# Patient Record
Sex: FEMALE | Race: BLACK OR AFRICAN AMERICAN | NOT HISPANIC OR LATINO | Employment: OTHER | URBAN - METROPOLITAN AREA
[De-identification: names, ages, dates, MRNs, and addresses within clinical notes are randomized per-mention and may not be internally consistent; named-entity substitution may affect disease eponyms.]

---

## 2017-01-01 ENCOUNTER — HOSPITAL ENCOUNTER (INPATIENT)
Facility: HOSPITAL | Age: 62
LOS: 2 days | DRG: 682 | End: 2017-10-29
Attending: EMERGENCY MEDICINE | Admitting: INTERNAL MEDICINE
Payer: MEDICAID

## 2017-01-01 ENCOUNTER — SURGERY (OUTPATIENT)
Age: 62
End: 2017-01-01

## 2017-01-01 ENCOUNTER — HOSPITAL ENCOUNTER (EMERGENCY)
Facility: HOSPITAL | Age: 62
Discharge: HOME | End: 2017-06-06
Attending: EMERGENCY MEDICINE
Payer: MEDICAID

## 2017-01-01 VITALS
HEART RATE: 82 BPM | WEIGHT: 204.13 LBS | RESPIRATION RATE: 23 BRPM | TEMPERATURE: 97 F | DIASTOLIC BLOOD PRESSURE: 38 MMHG | OXYGEN SATURATION: 96 % | SYSTOLIC BLOOD PRESSURE: 117 MMHG | BODY MASS INDEX: 39.87 KG/M2

## 2017-01-01 VITALS
HEART RATE: 87 BPM | BODY MASS INDEX: 32.2 KG/M2 | WEIGHT: 164 LBS | OXYGEN SATURATION: 95 % | DIASTOLIC BLOOD PRESSURE: 80 MMHG | HEIGHT: 60 IN | RESPIRATION RATE: 20 BRPM | SYSTOLIC BLOOD PRESSURE: 121 MMHG | TEMPERATURE: 98 F

## 2017-01-01 DIAGNOSIS — I50.32 CHRONIC DIASTOLIC HEART FAILURE DUE TO VALVULAR DISEASE: Chronic | ICD-10-CM

## 2017-01-01 DIAGNOSIS — N18.9 CHRONIC KIDNEY DISEASE, UNSPECIFIED CKD STAGE: ICD-10-CM

## 2017-01-01 DIAGNOSIS — I50.9 ACUTE ON CHRONIC CONGESTIVE HEART FAILURE, UNSPECIFIED CONGESTIVE HEART FAILURE TYPE: Primary | ICD-10-CM

## 2017-01-01 DIAGNOSIS — N18.3 ACUTE RENAL FAILURE SUPERIMPOSED ON STAGE 3 CHRONIC KIDNEY DISEASE, UNSPECIFIED ACUTE RENAL FAILURE TYPE: ICD-10-CM

## 2017-01-01 DIAGNOSIS — E87.20 METABOLIC ACIDOSIS: ICD-10-CM

## 2017-01-01 DIAGNOSIS — Z79.4 TYPE 2 DIABETES MELLITUS WITH STAGE 3 CHRONIC KIDNEY DISEASE, WITH LONG-TERM CURRENT USE OF INSULIN: Chronic | ICD-10-CM

## 2017-01-01 DIAGNOSIS — N17.9 ACUTE RENAL FAILURE SUPERIMPOSED ON STAGE 3 CHRONIC KIDNEY DISEASE, UNSPECIFIED ACUTE RENAL FAILURE TYPE: ICD-10-CM

## 2017-01-01 DIAGNOSIS — I05.0 SEVERE MITRAL VALVE STENOSIS: Chronic | ICD-10-CM

## 2017-01-01 DIAGNOSIS — N18.9 ACUTE KIDNEY INJURY SUPERIMPOSED ON CHRONIC KIDNEY DISEASE: ICD-10-CM

## 2017-01-01 DIAGNOSIS — I10 ESSENTIAL HYPERTENSION: Chronic | ICD-10-CM

## 2017-01-01 DIAGNOSIS — K72.00 SUBACUTE LIVER FAILURE WITHOUT HEPATIC COMA: ICD-10-CM

## 2017-01-01 DIAGNOSIS — N18.30 TYPE 2 DIABETES MELLITUS WITH STAGE 3 CHRONIC KIDNEY DISEASE, WITH LONG-TERM CURRENT USE OF INSULIN: Chronic | ICD-10-CM

## 2017-01-01 DIAGNOSIS — I38 CHRONIC DIASTOLIC HEART FAILURE DUE TO VALVULAR DISEASE: Chronic | ICD-10-CM

## 2017-01-01 DIAGNOSIS — N17.9 ACUTE KIDNEY INJURY SUPERIMPOSED ON CHRONIC KIDNEY DISEASE: ICD-10-CM

## 2017-01-01 DIAGNOSIS — N18.30 ACUTE RENAL FAILURE SUPERIMPOSED ON STAGE 3 CHRONIC KIDNEY DISEASE: ICD-10-CM

## 2017-01-01 DIAGNOSIS — N18.30 CHRONIC KIDNEY DISEASE, STAGE III (MODERATE): ICD-10-CM

## 2017-01-01 DIAGNOSIS — G93.41 ENCEPHALOPATHY, METABOLIC: ICD-10-CM

## 2017-01-01 DIAGNOSIS — N17.9 ACUTE RENAL FAILURE SUPERIMPOSED ON STAGE 3 CHRONIC KIDNEY DISEASE: ICD-10-CM

## 2017-01-01 DIAGNOSIS — R06.02 SOB (SHORTNESS OF BREATH): ICD-10-CM

## 2017-01-01 DIAGNOSIS — D68.9 COAGULOPATHY: ICD-10-CM

## 2017-01-01 DIAGNOSIS — E11.22 TYPE 2 DIABETES MELLITUS WITH STAGE 3 CHRONIC KIDNEY DISEASE, WITH LONG-TERM CURRENT USE OF INSULIN: Chronic | ICD-10-CM

## 2017-01-01 DIAGNOSIS — I50.9 CHF (CONGESTIVE HEART FAILURE): ICD-10-CM

## 2017-01-01 DIAGNOSIS — J43.9 PULMONARY EMPHYSEMA, UNSPECIFIED EMPHYSEMA TYPE: Chronic | ICD-10-CM

## 2017-01-01 DIAGNOSIS — E87.5 HYPERKALEMIA: Primary | ICD-10-CM

## 2017-01-01 LAB
ABO + RH BLD: NORMAL
ACANTHOCYTES BLD QL SMEAR: PRESENT
ALBUMIN SERPL BCP-MCNC: 2.6 G/DL
ALBUMIN SERPL BCP-MCNC: 2.7 G/DL
ALBUMIN SERPL BCP-MCNC: 3 G/DL
ALLENS TEST: ABNORMAL
ALLENS TEST: ABNORMAL
ALP SERPL-CCNC: 129 U/L
ALP SERPL-CCNC: 138 U/L
ALP SERPL-CCNC: 157 U/L
ALT SERPL W/O P-5'-P-CCNC: 116 U/L
ALT SERPL W/O P-5'-P-CCNC: 154 U/L
ALT SERPL W/O P-5'-P-CCNC: 84 U/L
AMMONIA PLAS-SCNC: 67 UMOL/L
AMMONIA PLAS-SCNC: 76 UMOL/L
AMORPH CRY URNS QL MICRO: ABNORMAL
AMPHET+METHAMPHET UR QL: NEGATIVE
ANION GAP SERPL CALC-SCNC: 10 MMOL/L
ANION GAP SERPL CALC-SCNC: 16 MMOL/L
ANION GAP SERPL CALC-SCNC: 19 MMOL/L
ANION GAP SERPL CALC-SCNC: 21 MMOL/L
ANION GAP SERPL CALC-SCNC: 21 MMOL/L
ANION GAP SERPL CALC-SCNC: 26 MMOL/L
ANISOCYTOSIS BLD QL SMEAR: ABNORMAL
APAP SERPL-MCNC: <3 UG/ML
APTT BLDCRRT: 60.4 SEC
APTT BLDCRRT: <21 SEC
AST SERPL-CCNC: 50 U/L
AST SERPL-CCNC: 506 U/L
AST SERPL-CCNC: 653 U/L
BACTERIA #/AREA URNS HPF: ABNORMAL /HPF
BARBITURATES UR QL SCN>200 NG/ML: NEGATIVE
BASOPHILS # BLD AUTO: 0.01 K/UL
BASOPHILS # BLD AUTO: 0.02 K/UL
BASOPHILS NFR BLD: 0.1 %
BASOPHILS NFR BLD: 0.2 %
BENZODIAZ UR QL SCN>200 NG/ML: NEGATIVE
BILIRUB SERPL-MCNC: 0.7 MG/DL
BILIRUB SERPL-MCNC: 3.4 MG/DL
BILIRUB SERPL-MCNC: 4.4 MG/DL
BILIRUB UR QL STRIP: ABNORMAL
BLD GP AB SCN CELLS X3 SERPL QL: NORMAL
BNP SERPL-MCNC: 1690 PG/ML
BNP SERPL-MCNC: 4628 PG/ML
BUN SERPL-MCNC: 33 MG/DL
BUN SERPL-MCNC: 46 MG/DL
BUN SERPL-MCNC: 52 MG/DL
BUN SERPL-MCNC: 76 MG/DL
BUN SERPL-MCNC: 79 MG/DL
BUN SERPL-MCNC: 86 MG/DL
BZE UR QL SCN: NEGATIVE
CALCIUM SERPL-MCNC: 8.2 MG/DL
CALCIUM SERPL-MCNC: 8.4 MG/DL
CALCIUM SERPL-MCNC: 8.4 MG/DL
CALCIUM SERPL-MCNC: 8.5 MG/DL
CALCIUM SERPL-MCNC: 8.7 MG/DL
CALCIUM SERPL-MCNC: 8.7 MG/DL
CANNABINOIDS UR QL SCN: NEGATIVE
CHLORIDE SERPL-SCNC: 104 MMOL/L
CHLORIDE SERPL-SCNC: 96 MMOL/L
CHLORIDE SERPL-SCNC: 96 MMOL/L
CHLORIDE SERPL-SCNC: 97 MMOL/L
CHLORIDE SERPL-SCNC: 97 MMOL/L
CHLORIDE SERPL-SCNC: 99 MMOL/L
CK MB SERPL-MCNC: 1.2 NG/ML
CK MB SERPL-RTO: 2 %
CK SERPL-CCNC: 580 U/L
CK SERPL-CCNC: 59 U/L
CK SERPL-CCNC: 59 U/L
CLARITY UR: CLEAR
CO2 SERPL-SCNC: 10 MMOL/L
CO2 SERPL-SCNC: 16 MMOL/L
CO2 SERPL-SCNC: 18 MMOL/L
CO2 SERPL-SCNC: 20 MMOL/L
CO2 SERPL-SCNC: 25 MMOL/L
CO2 SERPL-SCNC: 27 MMOL/L
COLOR UR: YELLOW
CREAT SERPL-MCNC: 1.1 MG/DL
CREAT SERPL-MCNC: 2.5 MG/DL
CREAT SERPL-MCNC: 2.8 MG/DL
CREAT SERPL-MCNC: 3.6 MG/DL
CREAT SERPL-MCNC: 3.7 MG/DL
CREAT SERPL-MCNC: 3.9 MG/DL
CREAT UR-MCNC: 174.2 MG/DL
DELSYS: ABNORMAL
DELSYS: ABNORMAL
DIFFERENTIAL METHOD: ABNORMAL
EOSINOPHIL # BLD AUTO: 0 K/UL
EOSINOPHIL # BLD AUTO: 0.1 K/UL
EOSINOPHIL NFR BLD: 0 %
EOSINOPHIL NFR BLD: 0.7 %
EP: 5
ERYTHROCYTE [DISTWIDTH] IN BLOOD BY AUTOMATED COUNT: 16.7 %
ERYTHROCYTE [DISTWIDTH] IN BLOOD BY AUTOMATED COUNT: 20.8 %
ERYTHROCYTE [DISTWIDTH] IN BLOOD BY AUTOMATED COUNT: 20.9 %
ERYTHROCYTE [DISTWIDTH] IN BLOOD BY AUTOMATED COUNT: 21.1 %
EST. GFR  (AFRICAN AMERICAN): 13 ML/MIN/1.73 M^2
EST. GFR  (AFRICAN AMERICAN): 14 ML/MIN/1.73 M^2
EST. GFR  (AFRICAN AMERICAN): 15 ML/MIN/1.73 M^2
EST. GFR  (AFRICAN AMERICAN): 20 ML/MIN/1.73 M^2
EST. GFR  (AFRICAN AMERICAN): 23 ML/MIN/1.73 M^2
EST. GFR  (AFRICAN AMERICAN): >60 ML/MIN/1.73 M^2
EST. GFR  (NON AFRICAN AMERICAN): 12 ML/MIN/1.73 M^2
EST. GFR  (NON AFRICAN AMERICAN): 12 ML/MIN/1.73 M^2
EST. GFR  (NON AFRICAN AMERICAN): 13 ML/MIN/1.73 M^2
EST. GFR  (NON AFRICAN AMERICAN): 17 ML/MIN/1.73 M^2
EST. GFR  (NON AFRICAN AMERICAN): 20 ML/MIN/1.73 M^2
EST. GFR  (NON AFRICAN AMERICAN): 54 ML/MIN/1.73 M^2
ESTIMATED AVG GLUCOSE: 105 MG/DL
FIO2: 21
FIO2: 40
GIANT PLATELETS BLD QL SMEAR: PRESENT
GLUCOSE SERPL-MCNC: 116 MG/DL
GLUCOSE SERPL-MCNC: 129 MG/DL
GLUCOSE SERPL-MCNC: 131 MG/DL
GLUCOSE SERPL-MCNC: 66 MG/DL
GLUCOSE SERPL-MCNC: 74 MG/DL
GLUCOSE SERPL-MCNC: 90 MG/DL
GLUCOSE UR QL STRIP: NEGATIVE
HBA1C MFR BLD HPLC: 5.3 %
HCO3 UR-SCNC: 22 MMOL/L (ref 24–28)
HCO3 UR-SCNC: 31.6 MMOL/L (ref 24–28)
HCT VFR BLD AUTO: 22.3 %
HCT VFR BLD AUTO: 28.1 %
HCT VFR BLD AUTO: 28.9 %
HCT VFR BLD AUTO: 28.9 %
HGB BLD-MCNC: 6.6 G/DL
HGB BLD-MCNC: 8.1 G/DL
HGB BLD-MCNC: 8.3 G/DL
HGB BLD-MCNC: 8.3 G/DL
HGB UR QL STRIP: ABNORMAL
HYALINE CASTS #/AREA URNS LPF: 0 /LPF
HYPOCHROMIA BLD QL SMEAR: ABNORMAL
INR PPP: 1.2
INR PPP: >10
INR PPP: >10
IP: 14
KETONES UR QL STRIP: NEGATIVE
LACTATE SERPL-SCNC: 0.8 MMOL/L
LACTATE SERPL-SCNC: 7.6 MMOL/L
LEUKOCYTE ESTERASE UR QL STRIP: ABNORMAL
LYMPHOCYTES # BLD AUTO: 0.8 K/UL
LYMPHOCYTES # BLD AUTO: 1.7 K/UL
LYMPHOCYTES # BLD AUTO: 1.7 K/UL
LYMPHOCYTES # BLD AUTO: 2 K/UL
LYMPHOCYTES NFR BLD: 12.9 %
LYMPHOCYTES NFR BLD: 13.5 %
LYMPHOCYTES NFR BLD: 15.3 %
LYMPHOCYTES NFR BLD: 5.6 %
MAGNESIUM SERPL-MCNC: 2.5 MG/DL
MAGNESIUM SERPL-MCNC: 3.5 MG/DL
MCH RBC QN AUTO: 22 PG
MCH RBC QN AUTO: 22.3 PG
MCH RBC QN AUTO: 22.6 PG
MCH RBC QN AUTO: 24.7 PG
MCHC RBC AUTO-ENTMCNC: 28.7 G/DL
MCHC RBC AUTO-ENTMCNC: 28.7 G/DL
MCHC RBC AUTO-ENTMCNC: 28.8 %
MCHC RBC AUTO-ENTMCNC: 29.6 G/DL
MCV RBC AUTO: 75 FL
MCV RBC AUTO: 77 FL
MCV RBC AUTO: 79 FL
MCV RBC AUTO: 86 FL
METHADONE UR QL SCN>300 NG/ML: NEGATIVE
MICROSCOPIC COMMENT: ABNORMAL
MODE: ABNORMAL
MODE: ABNORMAL
MONOCYTES # BLD AUTO: 1.3 K/UL
MONOCYTES # BLD AUTO: 2.3 K/UL
MONOCYTES NFR BLD: 10.2 %
MONOCYTES NFR BLD: 18.5 %
MONOCYTES NFR BLD: 8.6 %
MONOCYTES NFR BLD: 9.9 %
NEUTROPHILS # BLD AUTO: 10.1 K/UL
NEUTROPHILS # BLD AUTO: 12.5 K/UL
NEUTROPHILS # BLD AUTO: 8.5 K/UL
NEUTROPHILS # BLD AUTO: 9.5 K/UL
NEUTROPHILS NFR BLD: 68.5 %
NEUTROPHILS NFR BLD: 73.9 %
NEUTROPHILS NFR BLD: 77 %
NEUTROPHILS NFR BLD: 86.2 %
NITRITE UR QL STRIP: NEGATIVE
OPIATES UR QL SCN: NORMAL
OVALOCYTES BLD QL SMEAR: ABNORMAL
PCO2 BLDA: 45 MMHG (ref 35–45)
PCO2 BLDA: 47.4 MMHG (ref 35–45)
PCP UR QL SCN>25 NG/ML: NEGATIVE
PH SMN: 7.3 [PH] (ref 7.35–7.45)
PH SMN: 7.43 [PH] (ref 7.35–7.45)
PH UR STRIP: 5 [PH] (ref 5–8)
PHOSPHATE SERPL-MCNC: 4.4 MG/DL
PLATELET # BLD AUTO: 223 K/UL
PLATELET # BLD AUTO: 249 K/UL
PLATELET # BLD AUTO: 273 K/UL
PLATELET # BLD AUTO: 360 K/UL
PLATELET BLD QL SMEAR: ABNORMAL
PMV BLD AUTO: 10.1 FL
PMV BLD AUTO: 10.1 FL
PMV BLD AUTO: 10.3 FL
PMV BLD AUTO: 9.6 FL
PO2 BLDA: 175 MMHG (ref 80–100)
PO2 BLDA: 52 MMHG (ref 80–100)
POC BE: -4 MMOL/L
POC BE: 7 MMOL/L
POC SATURATED O2: 100 % (ref 95–100)
POC SATURATED O2: 82 % (ref 95–100)
POCT GLUCOSE: 131 MG/DL (ref 70–110)
POCT GLUCOSE: 135 MG/DL (ref 70–110)
POCT GLUCOSE: 140 MG/DL (ref 70–110)
POCT GLUCOSE: 166 MG/DL (ref 70–110)
POCT GLUCOSE: 174 MG/DL (ref 70–110)
POIKILOCYTOSIS BLD QL SMEAR: ABNORMAL
POLYCHROMASIA BLD QL SMEAR: ABNORMAL
POTASSIUM SERPL-SCNC: 4 MMOL/L
POTASSIUM SERPL-SCNC: 4.1 MMOL/L
POTASSIUM SERPL-SCNC: 4.4 MMOL/L
POTASSIUM SERPL-SCNC: 6.7 MMOL/L
POTASSIUM SERPL-SCNC: 6.7 MMOL/L
POTASSIUM SERPL-SCNC: 7.2 MMOL/L
PROT SERPL-MCNC: 6.2 G/DL
PROT SERPL-MCNC: 7.4 G/DL
PROT SERPL-MCNC: 8.3 G/DL
PROT UR QL STRIP: ABNORMAL
PROTHROMBIN TIME: 12.8 SEC
PROTHROMBIN TIME: >100 SEC
PROTHROMBIN TIME: >100 SEC
RBC # BLD AUTO: 2.96 M/UL
RBC # BLD AUTO: 3.28 M/UL
RBC # BLD AUTO: 3.68 M/UL
RBC # BLD AUTO: 3.78 M/UL
RBC #/AREA URNS HPF: 0 /HPF (ref 0–4)
SAMPLE: ABNORMAL
SAMPLE: ABNORMAL
SCHISTOCYTES BLD QL SMEAR: PRESENT
SITE: ABNORMAL
SITE: ABNORMAL
SODIUM SERPL-SCNC: 132 MMOL/L
SODIUM SERPL-SCNC: 135 MMOL/L
SODIUM SERPL-SCNC: 135 MMOL/L
SODIUM SERPL-SCNC: 137 MMOL/L
SODIUM SERPL-SCNC: 138 MMOL/L
SODIUM SERPL-SCNC: 141 MMOL/L
SP GR UR STRIP: 1.02 (ref 1–1.03)
SQUAMOUS #/AREA URNS HPF: 3 /HPF
TARGETS BLD QL SMEAR: ABNORMAL
TOXICOLOGY INFORMATION: NORMAL
TROPONIN I SERPL DL<=0.01 NG/ML-MCNC: 0.08 NG/ML
TROPONIN I SERPL DL<=0.01 NG/ML-MCNC: 0.08 NG/ML
TSH SERPL DL<=0.005 MIU/L-ACNC: 0.75 UIU/ML
URN SPEC COLLECT METH UR: ABNORMAL
UROBILINOGEN UR STRIP-ACNC: ABNORMAL EU/DL
WBC # BLD AUTO: 12.57 K/UL
WBC # BLD AUTO: 12.88 K/UL
WBC # BLD AUTO: 13.12 K/UL
WBC # BLD AUTO: 14.58 K/UL
WBC #/AREA URNS HPF: 4 /HPF (ref 0–5)
YEAST URNS QL MICRO: ABNORMAL

## 2017-01-01 PROCEDURE — 85025 COMPLETE CBC W/AUTO DIFF WBC: CPT

## 2017-01-01 PROCEDURE — P9047 ALBUMIN (HUMAN), 25%, 50ML: HCPCS | Performed by: INTERNAL MEDICINE

## 2017-01-01 PROCEDURE — 85610 PROTHROMBIN TIME: CPT

## 2017-01-01 PROCEDURE — 80053 COMPREHEN METABOLIC PANEL: CPT

## 2017-01-01 PROCEDURE — 84100 ASSAY OF PHOSPHORUS: CPT

## 2017-01-01 PROCEDURE — 27000221 HC OXYGEN, UP TO 24 HOURS

## 2017-01-01 PROCEDURE — 36000 PLACE NEEDLE IN VEIN: CPT

## 2017-01-01 PROCEDURE — 25000242 PHARM REV CODE 250 ALT 637 W/ HCPCS: Performed by: INTERNAL MEDICINE

## 2017-01-01 PROCEDURE — 80307 DRUG TEST PRSMV CHEM ANLYZR: CPT

## 2017-01-01 PROCEDURE — 36600 WITHDRAWAL OF ARTERIAL BLOOD: CPT

## 2017-01-01 PROCEDURE — 82803 BLOOD GASES ANY COMBINATION: CPT

## 2017-01-01 PROCEDURE — 11000001 HC ACUTE MED/SURG PRIVATE ROOM

## 2017-01-01 PROCEDURE — 63600175 PHARM REV CODE 636 W HCPCS: Performed by: EMERGENCY MEDICINE

## 2017-01-01 PROCEDURE — 63600175 PHARM REV CODE 636 W HCPCS: Performed by: INTERNAL MEDICINE

## 2017-01-01 PROCEDURE — 99291 CRITICAL CARE FIRST HOUR: CPT | Mod: ,,, | Performed by: NURSE PRACTITIONER

## 2017-01-01 PROCEDURE — 80048 BASIC METABOLIC PNL TOTAL CA: CPT

## 2017-01-01 PROCEDURE — 85730 THROMBOPLASTIN TIME PARTIAL: CPT

## 2017-01-01 PROCEDURE — 25000003 PHARM REV CODE 250: Performed by: EMERGENCY MEDICINE

## 2017-01-01 PROCEDURE — 25000003 PHARM REV CODE 250: Performed by: INTERNAL MEDICINE

## 2017-01-01 PROCEDURE — 20000000 HC ICU ROOM

## 2017-01-01 PROCEDURE — 86900 BLOOD TYPING SEROLOGIC ABO: CPT

## 2017-01-01 PROCEDURE — 99233 SBSQ HOSP IP/OBS HIGH 50: CPT | Mod: ,,, | Performed by: INTERNAL MEDICINE

## 2017-01-01 PROCEDURE — 83036 HEMOGLOBIN GLYCOSYLATED A1C: CPT

## 2017-01-01 PROCEDURE — 83605 ASSAY OF LACTIC ACID: CPT

## 2017-01-01 PROCEDURE — 82550 ASSAY OF CK (CPK): CPT

## 2017-01-01 PROCEDURE — 99291 CRITICAL CARE FIRST HOUR: CPT | Mod: ,,, | Performed by: INTERNAL MEDICINE

## 2017-01-01 PROCEDURE — 86901 BLOOD TYPING SEROLOGIC RH(D): CPT

## 2017-01-01 PROCEDURE — 25000242 PHARM REV CODE 250 ALT 637 W/ HCPCS: Performed by: NURSE PRACTITIONER

## 2017-01-01 PROCEDURE — 02H633Z INSERTION OF INFUSION DEVICE INTO RIGHT ATRIUM, PERCUTANEOUS APPROACH: ICD-10-PCS | Performed by: SURGERY

## 2017-01-01 PROCEDURE — 94640 AIRWAY INHALATION TREATMENT: CPT

## 2017-01-01 PROCEDURE — 93010 ELECTROCARDIOGRAM REPORT: CPT | Mod: ,,, | Performed by: INTERNAL MEDICINE

## 2017-01-01 PROCEDURE — 93005 ELECTROCARDIOGRAM TRACING: CPT

## 2017-01-01 PROCEDURE — 87340 HEPATITIS B SURFACE AG IA: CPT

## 2017-01-01 PROCEDURE — 25000003 PHARM REV CODE 250: Performed by: NURSE PRACTITIONER

## 2017-01-01 PROCEDURE — C1769 GUIDE WIRE: HCPCS

## 2017-01-01 PROCEDURE — 84484 ASSAY OF TROPONIN QUANT: CPT

## 2017-01-01 PROCEDURE — 96374 THER/PROPH/DIAG INJ IV PUSH: CPT

## 2017-01-01 PROCEDURE — 81000 URINALYSIS NONAUTO W/SCOPE: CPT

## 2017-01-01 PROCEDURE — 99900035 HC TECH TIME PER 15 MIN (STAT)

## 2017-01-01 PROCEDURE — 83880 ASSAY OF NATRIURETIC PEPTIDE: CPT

## 2017-01-01 PROCEDURE — 27000190 HC CPAP FULL FACE MASK W/VALVE

## 2017-01-01 PROCEDURE — 96375 TX/PRO/DX INJ NEW DRUG ADDON: CPT

## 2017-01-01 PROCEDURE — 84443 ASSAY THYROID STIM HORMONE: CPT

## 2017-01-01 PROCEDURE — 82140 ASSAY OF AMMONIA: CPT

## 2017-01-01 PROCEDURE — 36415 COLL VENOUS BLD VENIPUNCTURE: CPT

## 2017-01-01 PROCEDURE — 80329 ANALGESICS NON-OPIOID 1 OR 2: CPT

## 2017-01-01 PROCEDURE — 99285 EMERGENCY DEPT VISIT HI MDM: CPT | Mod: 25

## 2017-01-01 PROCEDURE — 90935 HEMODIALYSIS ONE EVALUATION: CPT

## 2017-01-01 PROCEDURE — 83735 ASSAY OF MAGNESIUM: CPT

## 2017-01-01 PROCEDURE — 63600175 PHARM REV CODE 636 W HCPCS: Performed by: NURSE PRACTITIONER

## 2017-01-01 PROCEDURE — 63600175 PHARM REV CODE 636 W HCPCS

## 2017-01-01 PROCEDURE — 86706 HEP B SURFACE ANTIBODY: CPT

## 2017-01-01 PROCEDURE — 96365 THER/PROPH/DIAG IV INF INIT: CPT

## 2017-01-01 PROCEDURE — 80048 BASIC METABOLIC PNL TOTAL CA: CPT | Mod: 91

## 2017-01-01 PROCEDURE — 94660 CPAP INITIATION&MGMT: CPT

## 2017-01-01 PROCEDURE — 82553 CREATINE MB FRACTION: CPT

## 2017-01-01 RX ORDER — IBUPROFEN 200 MG
16 TABLET ORAL
Status: DISCONTINUED | OUTPATIENT
Start: 2017-01-01 | End: 2017-01-01

## 2017-01-01 RX ORDER — INSULIN ASPART 100 [IU]/ML
0-5 INJECTION, SOLUTION INTRAVENOUS; SUBCUTANEOUS EVERY 6 HOURS
Status: DISCONTINUED | OUTPATIENT
Start: 2017-01-01 | End: 2017-01-01

## 2017-01-01 RX ORDER — IPRATROPIUM BROMIDE AND ALBUTEROL SULFATE 2.5; .5 MG/3ML; MG/3ML
3 SOLUTION RESPIRATORY (INHALATION) EVERY 6 HOURS
Status: DISCONTINUED | OUTPATIENT
Start: 2017-01-01 | End: 2017-01-01 | Stop reason: HOSPADM

## 2017-01-01 RX ORDER — ASPIRIN 81 MG/1
81 TABLET ORAL DAILY
Status: ON HOLD | COMMUNITY
End: 2017-01-01 | Stop reason: HOSPADM

## 2017-01-01 RX ORDER — ATORVASTATIN CALCIUM 40 MG/1
40 TABLET, FILM COATED ORAL DAILY
Status: ON HOLD | COMMUNITY
End: 2017-01-01 | Stop reason: HOSPADM

## 2017-01-01 RX ORDER — ALBUTEROL SULFATE 2.5 MG/.5ML
2.5 SOLUTION RESPIRATORY (INHALATION) EVERY 6 HOURS PRN
COMMUNITY

## 2017-01-01 RX ORDER — HYDRALAZINE HYDROCHLORIDE 50 MG/1
50 TABLET, FILM COATED ORAL 3 TIMES DAILY
Status: ON HOLD | COMMUNITY
End: 2017-01-01

## 2017-01-01 RX ORDER — DEXTROSE 50 % IN WATER (D50W) INTRAVENOUS SYRINGE
25
Status: COMPLETED | OUTPATIENT
Start: 2017-01-01 | End: 2017-01-01

## 2017-01-01 RX ORDER — OXYCODONE AND ACETAMINOPHEN 10; 325 MG/1; MG/1
1 TABLET ORAL
Status: ON HOLD | COMMUNITY
Start: 2017-01-01 | End: 2017-01-01 | Stop reason: HOSPADM

## 2017-01-01 RX ORDER — BUDESONIDE 0.5 MG/2ML
0.5 INHALANT ORAL EVERY 12 HOURS
Status: DISCONTINUED | OUTPATIENT
Start: 2017-01-01 | End: 2017-01-01 | Stop reason: HOSPADM

## 2017-01-01 RX ORDER — BENZONATATE 100 MG/1
100 CAPSULE ORAL
Status: ON HOLD | COMMUNITY
Start: 2017-01-01 | End: 2017-01-01 | Stop reason: HOSPADM

## 2017-01-01 RX ORDER — FOLIC ACID 1 MG/1
1 TABLET ORAL DAILY
Status: ON HOLD | COMMUNITY
End: 2017-01-01 | Stop reason: HOSPADM

## 2017-01-01 RX ORDER — ARFORMOTEROL TARTRATE 15 UG/2ML
15 SOLUTION RESPIRATORY (INHALATION) 2 TIMES DAILY
Status: DISCONTINUED | OUTPATIENT
Start: 2017-01-01 | End: 2017-01-01 | Stop reason: HOSPADM

## 2017-01-01 RX ORDER — SODIUM CHLORIDE 9 MG/ML
INJECTION, SOLUTION INTRAVENOUS CONTINUOUS
Status: DISCONTINUED | OUTPATIENT
Start: 2017-01-01 | End: 2017-01-01

## 2017-01-01 RX ORDER — ENOXAPARIN SODIUM 100 MG/ML
30 INJECTION SUBCUTANEOUS EVERY 24 HOURS
Status: DISCONTINUED | OUTPATIENT
Start: 2017-01-01 | End: 2017-01-01

## 2017-01-01 RX ORDER — HYDROCODONE BITARTRATE AND ACETAMINOPHEN 10; 325 MG/1; MG/1
TABLET ORAL
Status: ON HOLD | COMMUNITY
End: 2017-01-01

## 2017-01-01 RX ORDER — IBUPROFEN 200 MG
24 TABLET ORAL
Status: DISCONTINUED | OUTPATIENT
Start: 2017-01-01 | End: 2017-01-01

## 2017-01-01 RX ORDER — LACTULOSE 10 G/15ML
20 SOLUTION ORAL 3 TIMES DAILY
Status: DISCONTINUED | OUTPATIENT
Start: 2017-01-01 | End: 2017-01-01

## 2017-01-01 RX ORDER — FORMOTEROL FUMARATE DIHYDRATE 20 UG/2ML
20 SOLUTION RESPIRATORY (INHALATION) EVERY 12 HOURS
Status: DISCONTINUED | OUTPATIENT
Start: 2017-01-01 | End: 2017-01-01 | Stop reason: CLARIF

## 2017-01-01 RX ORDER — FLUTICASONE FUROATE AND VILANTEROL 100; 25 UG/1; UG/1
1 POWDER RESPIRATORY (INHALATION) DAILY
Status: ON HOLD | COMMUNITY
End: 2017-01-01 | Stop reason: HOSPADM

## 2017-01-01 RX ORDER — ACETAMINOPHEN 10 MG/ML
1000 INJECTION, SOLUTION INTRAVENOUS ONCE
Status: COMPLETED | OUTPATIENT
Start: 2017-01-01 | End: 2017-01-01

## 2017-01-01 RX ORDER — ALBUMIN HUMAN 250 G/1000ML
25 SOLUTION INTRAVENOUS ONCE
Status: COMPLETED | OUTPATIENT
Start: 2017-01-01 | End: 2017-01-01

## 2017-01-01 RX ORDER — IPRATROPIUM BROMIDE AND ALBUTEROL SULFATE 2.5; .5 MG/3ML; MG/3ML
3 SOLUTION RESPIRATORY (INHALATION) EVERY 6 HOURS
Qty: 1 BOX | Refills: 0 | Status: SHIPPED | OUTPATIENT
Start: 2017-01-01 | End: 2018-10-29

## 2017-01-01 RX ORDER — FERROUS SULFATE 324(65)MG
325 TABLET, DELAYED RELEASE (ENTERIC COATED) ORAL DAILY
Status: ON HOLD | COMMUNITY
End: 2017-01-01

## 2017-01-01 RX ORDER — IPRATROPIUM BROMIDE AND ALBUTEROL SULFATE 2.5; .5 MG/3ML; MG/3ML
3 SOLUTION RESPIRATORY (INHALATION) EVERY 6 HOURS PRN
Status: ON HOLD | COMMUNITY
End: 2017-01-01

## 2017-01-01 RX ORDER — GABAPENTIN 100 MG/1
100 CAPSULE ORAL
Status: ON HOLD | COMMUNITY
Start: 2017-01-01 | End: 2017-01-01 | Stop reason: HOSPADM

## 2017-01-01 RX ORDER — CARVEDILOL 3.12 MG/1
3.12 TABLET ORAL 2 TIMES DAILY
Status: DISCONTINUED | OUTPATIENT
Start: 2017-01-01 | End: 2017-01-01

## 2017-01-01 RX ORDER — GLUCAGON 1 MG
1 KIT INJECTION
Status: DISCONTINUED | OUTPATIENT
Start: 2017-01-01 | End: 2017-01-01

## 2017-01-01 RX ORDER — AMLODIPINE BESYLATE 10 MG/1
10 TABLET ORAL DAILY
Status: ON HOLD | COMMUNITY
End: 2017-01-01

## 2017-01-01 RX ORDER — PREDNISONE 10 MG/1
10 TABLET ORAL DAILY
Status: ON HOLD | COMMUNITY
End: 2017-01-01

## 2017-01-01 RX ORDER — CARVEDILOL 6.25 MG/1
6.25 TABLET ORAL 2 TIMES DAILY WITH MEALS
Status: ON HOLD | COMMUNITY
End: 2017-01-01

## 2017-01-01 RX ORDER — ALBUTEROL SULFATE 2.5 MG/.5ML
10 SOLUTION RESPIRATORY (INHALATION) ONCE
Status: COMPLETED | OUTPATIENT
Start: 2017-01-01 | End: 2017-01-01

## 2017-01-01 RX ORDER — FUROSEMIDE 10 MG/ML
40 INJECTION INTRAMUSCULAR; INTRAVENOUS
Status: COMPLETED | OUTPATIENT
Start: 2017-01-01 | End: 2017-01-01

## 2017-01-01 RX ORDER — CALCIUM GLUCONATE 98 MG/ML
1 INJECTION, SOLUTION INTRAVENOUS ONCE
Status: DISCONTINUED | OUTPATIENT
Start: 2017-01-01 | End: 2017-01-01

## 2017-01-01 RX ORDER — TRAMADOL HYDROCHLORIDE 50 MG/1
50 TABLET ORAL
Status: ON HOLD | COMMUNITY
Start: 2017-01-01 | End: 2017-01-01 | Stop reason: HOSPADM

## 2017-01-01 RX ORDER — FERROUS SULFATE 325(65) MG
325 TABLET, DELAYED RELEASE (ENTERIC COATED) ORAL 2 TIMES DAILY
Status: DISCONTINUED | OUTPATIENT
Start: 2017-01-01 | End: 2017-01-01

## 2017-01-01 RX ORDER — LISINOPRIL 2.5 MG/1
2.5 TABLET ORAL DAILY
Qty: 90 TABLET | Refills: 3 | Status: SHIPPED | OUTPATIENT
Start: 2017-01-01 | End: 2018-10-29

## 2017-01-01 RX ORDER — BUMETANIDE 2 MG/1
2 TABLET ORAL
Status: ON HOLD | COMMUNITY
Start: 2017-01-01 | End: 2017-01-01 | Stop reason: HOSPADM

## 2017-01-01 RX ORDER — MORPHINE SULFATE 2 MG/ML
2 INJECTION, SOLUTION INTRAMUSCULAR; INTRAVENOUS
Status: DISCONTINUED | OUTPATIENT
Start: 2017-01-01 | End: 2017-01-01 | Stop reason: HOSPADM

## 2017-01-01 RX ORDER — CLONAZEPAM 1 MG/1
1 TABLET ORAL 2 TIMES DAILY PRN
Status: ON HOLD | COMMUNITY
End: 2017-01-01

## 2017-01-01 RX ORDER — AMOXICILLIN 250 MG
1 CAPSULE ORAL
Status: ON HOLD | COMMUNITY
Start: 2017-01-01 | End: 2017-01-01 | Stop reason: HOSPADM

## 2017-01-01 RX ORDER — FLUTICASONE PROPIONATE 50 MCG
1 SPRAY, SUSPENSION (ML) NASAL
Status: ON HOLD | COMMUNITY
Start: 2017-01-01 | End: 2017-01-01 | Stop reason: HOSPADM

## 2017-01-01 RX ORDER — ISOSORBIDE MONONITRATE 30 MG/1
30 TABLET, EXTENDED RELEASE ORAL
Status: ON HOLD | COMMUNITY
Start: 2017-01-01 | End: 2017-01-01 | Stop reason: HOSPADM

## 2017-01-01 RX ORDER — BUDESONIDE AND FORMOTEROL FUMARATE DIHYDRATE 160; 4.5 UG/1; UG/1
2 AEROSOL RESPIRATORY (INHALATION) EVERY 12 HOURS
Status: ON HOLD | COMMUNITY
End: 2017-01-01

## 2017-01-01 RX ORDER — MORPHINE SULFATE 2 MG/ML
1 INJECTION, SOLUTION INTRAMUSCULAR; INTRAVENOUS EVERY 4 HOURS PRN
Status: DISCONTINUED | OUTPATIENT
Start: 2017-01-01 | End: 2017-01-01

## 2017-01-01 RX ORDER — ACETAMINOPHEN 500 MG
1000 TABLET ORAL
Status: COMPLETED | OUTPATIENT
Start: 2017-01-01 | End: 2017-01-01

## 2017-01-01 RX ORDER — ACETAMINOPHEN 325 MG/1
650 TABLET ORAL EVERY 8 HOURS PRN
Status: DISCONTINUED | OUTPATIENT
Start: 2017-01-01 | End: 2017-01-01

## 2017-01-01 RX ORDER — SODIUM CHLORIDE 9 MG/ML
INJECTION, SOLUTION INTRAVENOUS ONCE
Status: DISCONTINUED | OUTPATIENT
Start: 2017-01-01 | End: 2017-01-01 | Stop reason: HOSPADM

## 2017-01-01 RX ORDER — SODIUM CHLORIDE 9 MG/ML
INJECTION, SOLUTION INTRAVENOUS
Status: DISCONTINUED | OUTPATIENT
Start: 2017-01-01 | End: 2017-01-01

## 2017-01-01 RX ORDER — FAMOTIDINE 10 MG/ML
20 INJECTION INTRAVENOUS DAILY
Status: DISCONTINUED | OUTPATIENT
Start: 2017-01-01 | End: 2017-01-01

## 2017-01-01 RX ORDER — ZOLPIDEM TARTRATE 5 MG/1
5 TABLET ORAL NIGHTLY PRN
Status: ON HOLD | COMMUNITY
End: 2017-01-01

## 2017-01-01 RX ORDER — PROMETHAZINE HYDROCHLORIDE AND DEXTROMETHORPHAN HYDROBROMIDE 6.25; 15 MG/5ML; MG/5ML
SYRUP ORAL
Status: ON HOLD | COMMUNITY
End: 2017-01-01

## 2017-01-01 RX ORDER — ASPIRIN 81 MG/1
81 TABLET ORAL DAILY
Status: DISCONTINUED | OUTPATIENT
Start: 2017-01-01 | End: 2017-01-01

## 2017-01-01 RX ORDER — FUROSEMIDE 40 MG/1
40 TABLET ORAL 2 TIMES DAILY
Status: ON HOLD | COMMUNITY
End: 2017-01-01

## 2017-01-01 RX ORDER — HEPARIN SODIUM 1000 [USP'U]/ML
2000 INJECTION, SOLUTION INTRAVENOUS; SUBCUTANEOUS ONCE
Status: COMPLETED | OUTPATIENT
Start: 2017-01-01 | End: 2017-01-01

## 2017-01-01 RX ORDER — ALBUTEROL SULFATE 0.63 MG/3ML
0.63 SOLUTION RESPIRATORY (INHALATION) EVERY 6 HOURS PRN
Status: ON HOLD | COMMUNITY
End: 2017-01-01

## 2017-01-01 RX ORDER — ONDANSETRON 2 MG/ML
4 INJECTION INTRAMUSCULAR; INTRAVENOUS EVERY 12 HOURS PRN
Status: DISCONTINUED | OUTPATIENT
Start: 2017-01-01 | End: 2017-01-01 | Stop reason: HOSPADM

## 2017-01-01 RX ADMIN — ACETAMINOPHEN 1000 MG: 10 INJECTION, SOLUTION INTRAVENOUS at 02:06

## 2017-01-01 RX ADMIN — ALBUTEROL SULFATE 10 MG: 2.5 SOLUTION RESPIRATORY (INHALATION) at 05:10

## 2017-01-01 RX ADMIN — IPRATROPIUM BROMIDE AND ALBUTEROL SULFATE 3 ML: .5; 3 SOLUTION RESPIRATORY (INHALATION) at 01:10

## 2017-01-01 RX ADMIN — BUDESONIDE 0.5 MG: 0.5 SUSPENSION RESPIRATORY (INHALATION) at 07:10

## 2017-01-01 RX ADMIN — SODIUM CHLORIDE: 0.9 INJECTION, SOLUTION INTRAVENOUS at 12:10

## 2017-01-01 RX ADMIN — ARFORMOTEROL TARTRATE 15 MCG: 15 SOLUTION RESPIRATORY (INHALATION) at 07:10

## 2017-01-01 RX ADMIN — DEXTROSE MONOHYDRATE 25 G: 25 INJECTION, SOLUTION INTRAVENOUS at 10:10

## 2017-01-01 RX ADMIN — INSULIN HUMAN 10 UNITS: 100 INJECTION, SOLUTION PARENTERAL at 03:10

## 2017-01-01 RX ADMIN — ACETAMINOPHEN 1000 MG: 500 TABLET ORAL at 09:06

## 2017-01-01 RX ADMIN — HEPARIN SODIUM 2000 UNITS: 1000 INJECTION, SOLUTION INTRAVENOUS; SUBCUTANEOUS at 03:10

## 2017-01-01 RX ADMIN — ALBUMIN HUMAN 25 G: 0.25 SOLUTION INTRAVENOUS at 03:10

## 2017-01-01 RX ADMIN — FERROUS SULFATE TAB EC 325 MG (65 MG FE EQUIVALENT) 325 MG: 325 (65 FE) TABLET DELAYED RESPONSE at 10:10

## 2017-01-01 RX ADMIN — Medication 1 G: at 10:10

## 2017-01-01 RX ADMIN — LORAZEPAM 2 MG: 2 INJECTION INTRAMUSCULAR; INTRAVENOUS at 07:10

## 2017-01-01 RX ADMIN — DEXTROSE MONOHYDRATE 25 G: 25 INJECTION, SOLUTION INTRAVENOUS at 03:10

## 2017-01-01 RX ADMIN — IPRATROPIUM BROMIDE AND ALBUTEROL SULFATE 3 ML: .5; 3 SOLUTION RESPIRATORY (INHALATION) at 07:10

## 2017-01-01 RX ADMIN — METHYLPREDNISOLONE SODIUM SUCCINATE 125 MG: 125 INJECTION, POWDER, FOR SOLUTION INTRAMUSCULAR; INTRAVENOUS at 02:06

## 2017-01-01 RX ADMIN — FUROSEMIDE 40 MG: 10 INJECTION, SOLUTION INTRAMUSCULAR; INTRAVENOUS at 03:06

## 2017-01-01 RX ADMIN — MORPHINE SULFATE 2 MG: 2 INJECTION, SOLUTION INTRAMUSCULAR; INTRAVENOUS at 01:10

## 2017-01-01 RX ADMIN — INSULIN HUMAN 10 UNITS: 100 INJECTION, SOLUTION PARENTERAL at 10:10

## 2017-01-01 RX ADMIN — ASPIRIN 81 MG: 81 TABLET, COATED ORAL at 10:10

## 2017-01-01 RX ADMIN — SODIUM POLYSTYRENE SULFONATE 45 G: 15 SUSPENSION ORAL; RECTAL at 03:10

## 2017-01-01 RX ADMIN — PHYTONADIONE 10 MG: 10 INJECTION, EMULSION INTRAMUSCULAR; INTRAVENOUS; SUBCUTANEOUS at 08:10

## 2017-01-01 RX ADMIN — MORPHINE SULFATE 2 MG: 2 INJECTION, SOLUTION INTRAMUSCULAR; INTRAVENOUS at 07:10

## 2017-01-01 RX ADMIN — SODIUM POLYSTYRENE SULFONATE 30 G: 15 SUSPENSION ORAL; RECTAL at 10:10

## 2017-01-01 RX ADMIN — LACTULOSE 20 G: 10 SOLUTION ORAL at 10:10

## 2017-01-01 RX ADMIN — MORPHINE SULFATE 1 MG: 2 INJECTION, SOLUTION INTRAMUSCULAR; INTRAVENOUS at 06:10

## 2017-01-01 RX ADMIN — Medication 1 G: at 03:10

## 2017-06-06 NOTE — ED NOTES
Patient laying in bed in no acute distress.  Vitals are stable.  Bed is in low, locked, position with side rails up x 2 and call light within reach.  Will continue to monitor.

## 2017-06-06 NOTE — ED NOTES
Patient arrived via EMS on Bipap mask - O2 sat 96%  RR 30    Dr. King at bedside to assess patient.

## 2017-06-06 NOTE — ED NOTES
Informed Dr. King that patient is requesting pain medication due to tail bone pain from fall at home.

## 2017-06-06 NOTE — ED NOTES
Patient resting in bed with lights turned out, per her request.  Patient tolerating NC oxygen well.  Intermittent cough noted.  Patient is able to speak and communicate with staff without issue.  No acute distress is noted at this time.  Vitals are stable.    Dr. King has been notified of patient's current status.    Bed has been placed in low, locked position with side rails up x 2 and call light within reach.  Will continue to monitor.

## 2017-06-06 NOTE — ED NOTES
Patient placed on 4 L NC oxygen per Dr. King's order.  Patient tolerating well.  O2 sat 98%  RR 21.  Dr. King and Leona with Respiratory are aware.

## 2017-10-27 PROBLEM — E87.5 HYPERKALEMIA: Status: ACTIVE | Noted: 2017-01-01

## 2017-10-28 PROBLEM — J96.11 CHRONIC RESPIRATORY FAILURE WITH HYPOXIA: Status: ACTIVE | Noted: 2017-01-01

## 2017-10-28 PROBLEM — G25.3 MYOCLONUS: Status: ACTIVE | Noted: 2017-01-01

## 2017-10-28 PROBLEM — T50.901A ACCIDENTAL DRUG OVERDOSE: Status: ACTIVE | Noted: 2017-01-01

## 2017-10-28 PROBLEM — N17.9 ACUTE RENAL FAILURE SUPERIMPOSED ON STAGE 3 CHRONIC KIDNEY DISEASE: Status: ACTIVE | Noted: 2017-01-01

## 2017-10-28 PROBLEM — F41.9 ANXIETY: Status: ACTIVE | Noted: 2017-01-01

## 2017-10-28 PROBLEM — S46.009A ROTATOR CUFF INJURY: Status: ACTIVE | Noted: 2017-01-01

## 2017-10-28 PROBLEM — E87.20 METABOLIC ACIDOSIS: Status: ACTIVE | Noted: 2017-01-01

## 2017-10-28 PROBLEM — N18.30 ACUTE RENAL FAILURE SUPERIMPOSED ON STAGE 3 CHRONIC KIDNEY DISEASE: Status: ACTIVE | Noted: 2017-01-01

## 2017-10-28 PROBLEM — Z60.9 POOR SOCIAL SITUATION: Status: ACTIVE | Noted: 2017-01-01

## 2017-10-28 PROBLEM — N18.30 CHRONIC KIDNEY DISEASE, STAGE III (MODERATE): Status: ACTIVE | Noted: 2017-01-01

## 2017-10-28 PROBLEM — G47.33 OBSTRUCTIVE SLEEP APNEA: Status: ACTIVE | Noted: 2017-01-01

## 2017-10-28 PROBLEM — F19.10 POLYSUBSTANCE ABUSE: Chronic | Status: ACTIVE | Noted: 2017-01-01

## 2017-10-28 PROBLEM — I25.10 CORONARY ARTERY DISEASE INVOLVING NATIVE CORONARY ARTERY: Status: ACTIVE | Noted: 2017-01-01

## 2017-10-28 PROBLEM — F19.10 IV DRUG ABUSE: Chronic | Status: ACTIVE | Noted: 2017-01-01

## 2017-10-28 PROBLEM — D63.1 ANEMIA IN CHRONIC KIDNEY DISEASE: Status: ACTIVE | Noted: 2017-01-01

## 2017-10-28 PROBLEM — I95.1 ORTHOSTATIC HYPOTENSION: Status: ACTIVE | Noted: 2017-01-01

## 2017-10-28 PROBLEM — E66.9 OBESITY: Status: ACTIVE | Noted: 2017-01-01

## 2017-10-28 PROBLEM — N18.9 ANEMIA IN CHRONIC KIDNEY DISEASE: Status: ACTIVE | Noted: 2017-01-01

## 2017-10-28 PROBLEM — K72.00 SUBACUTE LIVER FAILURE WITHOUT HEPATIC COMA: Status: ACTIVE | Noted: 2017-01-01

## 2017-10-28 PROBLEM — G93.41 ENCEPHALOPATHY, METABOLIC: Status: ACTIVE | Noted: 2017-01-01

## 2017-10-28 PROBLEM — G89.21 CHRONIC PAIN DUE TO INJURY: Status: ACTIVE | Noted: 2017-01-01

## 2017-10-28 PROBLEM — E86.1 HYPOVOLEMIA: Status: ACTIVE | Noted: 2017-01-01

## 2017-10-28 PROBLEM — I48.0 PAROXYSMAL ATRIAL FIBRILLATION: Status: ACTIVE | Noted: 2017-01-01

## 2017-10-28 PROBLEM — I25.10 CORONARY ARTERY DISEASE INVOLVING NATIVE CORONARY ARTERY: Chronic | Status: ACTIVE | Noted: 2017-01-01

## 2017-10-28 PROBLEM — I05.0 SEVERE MITRAL VALVE STENOSIS: Chronic | Status: ACTIVE | Noted: 2017-01-01

## 2017-10-28 PROBLEM — B18.2 CHRONIC HEPATITIS C WITHOUT HEPATIC COMA: Chronic | Status: ACTIVE | Noted: 2017-01-01

## 2017-10-28 PROBLEM — I27.20 PULMONARY HYPERTENSION: Status: ACTIVE | Noted: 2017-01-01

## 2017-10-28 NOTE — PLAN OF CARE
Discharge planning assessment completed with assistance of patient's daughter, Adriana Nieto, at the bedside.  Per Ms. Wright, patient had been independent and gradually began to decline.  Patieht had home O2, CPAP, and nebulzier.  Patient lives at home with daughter.  Patient currently in ICU and discharge disposition is pending.  Case management will continue to f/u with patient to assist with discharge planning.       10/28/17 1664   Discharge Assessment   Assessment Type Discharge Planning Assessment   Confirmed/corrected address and phone number on facesheet? Yes   Assessment information obtained from? Caregiver   Communicated expected length of stay with patient/caregiver no   Prior to hospitilization cognitive status: Unable to Assess   Prior to hospitalization functional status: Needs Assistance   Current cognitive status: Unable to Assess   Current Functional Status: Needs Assistance   Facility Arrived From: Home   Lives With child(elissa), adult   Able to Return to Prior Arrangements yes   Is patient able to care for self after discharge? Unable to determine at this time (comments)   Who are your caregiver(s) and their phone number(s)? Adriana Nieto, Daughter, [phone]   Patient's perception of discharge disposition other (comments)  (TBD)   Readmission Within The Last 30 Days no previous admission in last 30 days   Patient currently being followed by outpatient case management? No   Patient currently receives any other outside agency services? No   Equipment Currently Used at Home oxygen;CPAP;nebulizer   Do you have any problems affording any of your prescribed medications? No   Is the patient taking medications as prescribed? yes   Does the patient have transportation home? Yes   Transportation Available family or friend will provide   Does the patient receive services at the [facility]? No   Discharge Plan A (TBD)   Discharge Plan B (TBD)   Patient/Family In Agreement With Plan yes   Demetra Valderrama  ILIR, TAMIKA, [facility]  10/28/2017

## 2017-10-28 NOTE — ED PROVIDER NOTES
SCRIBE #1 NOTE: INatalie, am scribing for, and in the presence of, Adama Teixeira MD. I have scribed the entire note.     SCRIBE #2 NOTE: ILaura, am scribing for, and in the presence of,  Adama Teixeira MD. I have scribed the remaining portions of the note not scribed by Scribe #1.     History      Chief Complaint   Patient presents with    Abnormal Lab     Potassium 7.1.  D/C from [facility] yesterday       Review of patient's allergies indicates:   Allergen Reactions    Corticosteroids (glucocorticoids) Shortness Of Breath    Corticosteroids (glucocorticoids) Shortness Of Breath     Per prior information from merged chart.        HPI   HPI    10/27/2017, 7:44 PM   History obtained from the patient      History of Present Illness: Becki Nieto is a 62 y.o. female patient who presents to the Emergency Department for an abnormal lab. Patient was admitted to [facility] for 2 weeks because she overdosed. [facility] reports that patient is on pain medication and was positive for cocaine, opiates, and alcohol. Pt was released from [facility] yesterday and placed in a NH where she had labs taken. Her potassium was 7.1. Symptoms are constant and moderate in severity. No mitigating or exacerbating factors reported. No other sxs reported. Patient denies fever, chills, CP, SOB, abd pain, N/V/D, extremity weakness/numbness, dizziness, and all other sxs at this time. No further complaints or concerns at this time.     Arrival mode: Ambulance    PCP: Noel Winchester MD       Past Medical History:  Past Medical History:   Diagnosis Date    Anxiety     Asthma     CHF (congestive heart failure)     Chronic pain     Back and Shoulder    COPD (chronic obstructive pulmonary disease)     Diabetes     Diabetes mellitus     Hep C w/o coma, chronic     Hepatitis C     High cholesterol     Hypertension        Past Surgical History:  Past Surgical History:   Procedure Laterality Date    APPENDECTOMY      BACK SURGERY       CHOLECYSTECTOMY      ROTATOR CUFF REPAIR           Family History:  No family history given.  Social History:  Social History     Social History Main Topics    Smoking status: Former Smoker     Packs/day: 1.00     Years: 40.00     Types: Cigarettes     Quit date: 2/16/2014    Smokeless tobacco: Not given    Alcohol use Yes    Drug use: No    Sexual activity: Not given       ROS   Review of Systems   Constitutional: Negative for chills, diaphoresis and fever.   HENT: Negative for sore throat.    Respiratory: Negative for shortness of breath.    Cardiovascular: Negative for chest pain.   Gastrointestinal: Negative for abdominal pain, diarrhea, nausea and vomiting.   Genitourinary: Negative for dysuria.   Musculoskeletal: Negative for back pain.   Skin: Negative for rash.   Neurological: Negative for dizziness, syncope, weakness, light-headedness and headaches.   Hematological: Does not bruise/bleed easily.   All other systems reviewed and are negative.      Physical Exam      Initial Vitals [10/27/17 1943]   BP Pulse Resp Temp SpO2   (!) 104/52 74 16 97.5 °F (36.4 °C) 97 %      MAP       69.33          Physical Exam  Nursing Notes and Vital Signs Reviewed.  Constitutional: Patient is in no acute distress. Awake and alert. Well-developed and well-nourished.  Head: Atraumatic. Normocephalic.  Eyes: PERRL. EOM intact. Conjunctivae are not pale. No scleral icterus.  ENT: Mucous membranes are moist. Oropharynx is clear and symmetric.    Neck: Supple. Full ROM. No lymphadenopathy.  Cardiovascular: Regular rate. Regular rhythm. No murmurs, rubs, or gallops. Distal pulses are 2+ and symmetric.  Pulmonary/Chest: No respiratory distress. Clear to auscultation bilaterally. No wheezing, rales, or rhonchi.  Abdominal: Soft and non-distended.  There is no tenderness.  No rebound, guarding, or rigidity.  Good bowel sounds.    Musculoskeletal: Moves all extremities. No obvious deformities. No edema. No calf tenderness.  Skin:  Warm and dry.  Neurological:  Alert, awake, and appropriate.  Normal speech.  No acute focal neurological deficits are appreciated.  Psychiatric: Normal affect. Good eye contact. Appropriate in content.    ED Course    External Jugular IV  Date/Time: 10/27/2017 10:46 PM  Performed by: WARREN COLLAZO  Authorized by: WARREN COLLAZO     Anesthesia:  Local Anesthetic: lidocaine 1% without epinephrine  Location (Ext Jugular): Right.  Catheter Size: 20 ga.  Number of attempts: 1  Fixation/Dressing: Taped in place.  Patient tolerance: Patient tolerated the procedure well with no immediate complications        ED Vital Signs:  Vitals:    10/27/17 1943 10/27/17 2024 10/27/17 2053 10/27/17 2144   BP: (!) 104/52 124/75 116/66 (!) 134/50   Pulse: 74 68 67 80   Resp: 16 18 12 (!) 9   Temp: 97.5 °F (36.4 °C)      TempSrc: Oral      SpO2: 97% 99% 100% 100%    10/27/17 2244   BP: (!) 136/57   Pulse: 74   Resp: 20   Temp:    TempSrc:    SpO2: 100%       Abnormal Lab Results:  Labs Reviewed   CBC W/ AUTO DIFFERENTIAL - Abnormal; Notable for the following:        Result Value    WBC 13.12 (*)     RBC 3.68 (*)     Hemoglobin 8.3 (*)     Hematocrit 28.9 (*)     MCV 79 (*)     MCH 22.6 (*)     MCHC 28.7 (*)     RDW 21.1 (*)     Platelets 360 (*)     Gran # 10.1 (*)     Mono # 1.3 (*)     Gran% 77.0 (*)     Lymph% 12.9 (*)     All other components within normal limits   COMPREHENSIVE METABOLIC PANEL - Abnormal; Notable for the following:     Sodium 135 (*)     Potassium 6.7 (*)     CO2 18 (*)     Glucose 66 (*)     BUN, Bld 76 (*)     Creatinine 3.6 (*)     Albumin 2.7 (*)     Total Bilirubin 4.4 (*)     Alkaline Phosphatase 138 (*)      (*)      (*)     Anion Gap 21 (*)     eGFR if  15 (*)     eGFR if non  13 (*)     All other components within normal limits    Narrative:        critical result(s) called and verbal readback obtained from   SANCHEZ Gusman.7 JUANITA SMART RN , 10/27/2017 21:28    URINALYSIS - Abnormal; Notable for the following:     Protein, UA 2+ (*)     Bilirubin (UA) 1+ (*)     Occult Blood UA Trace (*)     Urobilinogen, UA 2.0-3.0 (*)     Leukocytes, UA 1+ (*)     All other components within normal limits   URINALYSIS MICROSCOPIC - Abnormal; Notable for the following:     Yeast, UA Occasional (*)     All other components within normal limits        All Lab Results:  Results for orders placed or performed during the hospital encounter of 10/27/17   CBC auto differential   Result Value Ref Range    WBC 13.12 (H) 3.90 - 12.70 K/uL    RBC 3.68 (L) 4.00 - 5.40 M/uL    Hemoglobin 8.3 (L) 12.0 - 16.0 g/dL    Hematocrit 28.9 (L) 37.0 - 48.5 %    MCV 79 (L) 82 - 98 fL    MCH 22.6 (L) 27.0 - 31.0 pg    MCHC 28.7 (L) 32.0 - 36.0 g/dL    RDW 21.1 (H) 11.5 - 14.5 %    Platelets 360 (H) 150 - 350 K/uL    MPV 10.1 9.2 - 12.9 fL    Gran # 10.1 (H) 1.8 - 7.7 K/uL    Lymph # 1.7 1.0 - 4.8 K/uL    Mono # 1.3 (H) 0.3 - 1.0 K/uL    Eos # 0.0 0.0 - 0.5 K/uL    Baso # 0.01 0.00 - 0.20 K/uL    Gran% 77.0 (H) 38.0 - 73.0 %    Lymph% 12.9 (L) 18.0 - 48.0 %    Mono% 10.2 4.0 - 15.0 %    Eosinophil% 0.0 0.0 - 8.0 %    Basophil% 0.1 0.0 - 1.9 %    Differential Method Automated    Comprehensive metabolic panel   Result Value Ref Range    Sodium 135 (L) 136 - 145 mmol/L    Potassium 6.7 (HH) 3.5 - 5.1 mmol/L    Chloride 96 95 - 110 mmol/L    CO2 18 (L) 23 - 29 mmol/L    Glucose 66 (L) 70 - 110 mg/dL    BUN, Bld 76 (H) 8 - 23 mg/dL    Creatinine 3.6 (H) 0.5 - 1.4 mg/dL    Calcium 8.7 8.7 - 10.5 mg/dL    Total Protein 7.4 6.0 - 8.4 g/dL    Albumin 2.7 (L) 3.5 - 5.2 g/dL    Total Bilirubin 4.4 (H) 0.1 - 1.0 mg/dL    Alkaline Phosphatase 138 (H) 55 - 135 U/L     (H) 10 - 40 U/L     (H) 10 - 44 U/L    Anion Gap 21 (H) 8 - 16 mmol/L    eGFR if African American 15 (A) >60 mL/min/1.73 m^2    eGFR if non African American 13 (A) >60 mL/min/1.73 m^2   Urinalysis   Result Value Ref Range    Specimen UA Urine,  Catheterized     Color, UA Yellow Yellow, Straw, Megan    Appearance, UA Clear Clear    pH, UA 5.0 5.0 - 8.0    Specific Gravity, UA 1.020 1.005 - 1.030    Protein, UA 2+ (A) Negative    Glucose, UA Negative Negative    Ketones, UA Negative Negative    Bilirubin (UA) 1+ (A) Negative    Occult Blood UA Trace (A) Negative    Nitrite, UA Negative Negative    Urobilinogen, UA 2.0-3.0 (A) <2.0 EU/dL    Leukocytes, UA 1+ (A) Negative   Urinalysis Microscopic   Result Value Ref Range    RBC, UA 0 0 - 4 /hpf    WBC, UA 4 0 - 5 /hpf    Bacteria, UA Occasional None-Occ /hpf    Yeast, UA Occasional (A) None    Squam Epithel, UA 3 /hpf    Hyaline Casts, UA 0 0-1/lpf /lpf    Amorphous, UA Occasional None-Moderate    Microscopic Comment SEE COMMENT      The EKG was ordered, reviewed, and independently interpreted by the ED provider.  Interpretation time: 20:18  Rate: 68 BPM  Rhythm: Accelerated junctional rhythm   Interpretation: low voltage QRS. Cannot rule out anterior infarct, age undetermined. Prolonged QT. Abnormal ECG. No STEMI.    The Emergency Provider reviewed the vital signs and test results, which are outlined above.    ED Discussion   10:47 PM: Discussed case with Dr. Friedman ([facility]). Dr. Friedman agrees with current care and management of pt and accepts admission.   Admitting Service: [facility]   Admitting Physician: Dr. Friedman  Admit to: Tele    10:48 PM: Re-evaluated pt. I have discussed test results, shared treatment plan, and the need for admission with patient and family at bedside. Pt and family express understanding at this time and agree with all information. All questions answered. Pt and family have no further questions or concerns at this time. Pt is ready for admit.      ED Medication(s):  Medications   calcium gluconate 1 g in dextrose 5 % 100 mL IVPB (premix) (1 g Intravenous New Bag 10/27/17 2207)   dextrose 50 % in water (D50W) injection 25 g (25 g Intravenous Given 10/27/17 2207)    insulin regular injection 10 Units (10 Units Intravenous Given 10/27/17 2208)   sodium polystyrene 15 gram/60 mL suspension 30 g (30 g Oral Given 10/27/17 2207)       New Prescriptions    No medications on file            Medical Decision Making    Medical Decision Making:   Clinical Tests:   Lab Tests: Reviewed and Ordered  Medical Tests: Reviewed and Ordered           Scribe Attestation:   Scribe #1: I performed the above scribed service and the documentation accurately describes the services I performed. I attest to the accuracy of the note.    Attending:   Physician Attestation Statement for Scribe #1: I, Adama Teixeira MD, personally performed the services described in this documentation, as scribed by Natalie Carranza, in my presence, and it is both accurate and complete.       Scribe Attestation:   Scribe #2: I performed the above scribed service and the documentation accurately describes the services I performed. I attest to the accuracy of the note.    Attending Attestation:           Physician Attestation for Scribe:    Physician Attestation Statement for Scribe #2: I, Adama Teixeira MD, reviewed documentation, as scribed by Laura Bailey in my presence, and it is both accurate and complete. I also acknowledge and confirm the content of the note done by Scribe #1.          Clinical Impression       ICD-10-CM ICD-9-CM   1. Hyperkalemia E87.5 276.7   2. Acute kidney injury superimposed on chronic kidney disease N17.9 866.00    N18.9 585.9       Disposition:   Disposition: Admitted  Condition: Fair         Adama Teixeira MD  10/27/17 8395

## 2017-10-28 NOTE — ASSESSMENT & PLAN NOTE
Suspect worsening liver labs and functioning 2/2 heart failure as the AST elevation compared to ALT is most c/w ischemia. Patient's overall prognosis is very poor given her severe heart issues that are not amenable to intervention in addition to her multiorgan failure.  -Check INR daily  -Check lactic acid and BNP  -Check CMP and CBC daily  -Check acetaminophen level  -Cards or pulm input for heart failure

## 2017-10-28 NOTE — HPI
Unable to obtain history from patient due to AMS.  History obtained from ED staff, NH records, and medical records reviewed in Epic.  Ms. Nieto is a 61yo AA female with an extensive PMHx of multivessel CAD, severe mitral valve stenosis, severe tricuspid insufficiency, severe secondary pulmonary hypertension, HTN, chronic diastolic HFpEF of 60%, HLD, COPD with chronic respiratory failure on home O2, Hep C, noncompliance, polysubstance abuse with h/o IV drug use, CKD stage III, and DM II, who was transferred from NH to ED due to elevated potassium (7.1) found on routine lab work today.  Patient was on Bumex 2mg daily with no potassium supplementation at NH.  Initial work-up in ED resulted K 6.7, BUN 76, cr. 3.6, T bili 4.4, , , H&H 8.3/29.  She was just DC from [facility] yesterday with K 4.8, cr, 1.59.  Patient received calcium gluconate IV, insulin IV with D50, and Kayexalate in ED.  No arrhythmias on telemetry monitor, EKG stable.  Hospital Medicine was consulted for admission.  Currently, patient appears comfortable, in NAD.  She opens eye to verbal stimuli, but only mumbles incomprehensible words.  It is important to note that patient underwent RHC + LHC at [facility] on 10/20/17.  Patient has numerous prior admissions at [facility] for decompensated HF, CAD, and valvular heart disease (13 admissions in the past 1 year).  Last hospitalized at [facility] 10/9/2017-10/26/2017 for noncompliance/drug overdose.  During that admission, she was evaluated by Cardiology and CVT surgery, and deemed to be poor surgical candidate.  Hospice was recommended, but patient declined.  Subsequently, she was DC to NH facility.

## 2017-10-28 NOTE — SUBJECTIVE & OBJECTIVE
Past Medical History:   Diagnosis Date    Anxiety     Asthma     CHF (congestive heart failure)     Chronic pain     Back and Shoulder    COPD (chronic obstructive pulmonary disease)     Diabetes     Diabetes mellitus     Hep C w/o coma, chronic     Hepatitis C     High cholesterol     Hypertension        Past Surgical History:   Procedure Laterality Date    APPENDECTOMY      BACK SURGERY      CHOLECYSTECTOMY      ROTATOR CUFF REPAIR         Review of patient's allergies indicates:   Allergen Reactions    Corticosteroids (glucocorticoids) Shortness Of Breath    Corticosteroids (glucocorticoids) Shortness Of Breath     Per prior information from merged chart.       No current facility-administered medications on file prior to encounter.      Current Outpatient Prescriptions on File Prior to Encounter   Medication Sig    aspirin (ECOTRIN) 81 MG EC tablet Take 81 mg by mouth once daily.    atorvastatin (LIPITOR) 40 MG tablet Take 40 mg by mouth once daily.    ferrous sulfate 325 (65 FE) MG EC tablet Take 1 tablet (325 mg total) by mouth 2 (two) times daily.    insulin glargine (LANTUS) 100 unit/mL injection Inject 10 Units into the skin once daily.     metoprolol tartrate (LOPRESSOR) 25 MG tablet Take 1 tablet (25 mg total) by mouth 2 (two) times daily.    albuterol (ACCUNEB) 0.63 mg/3 mL Nebu Take 0.63 mg by nebulization every 6 (six) hours as needed. Rescue    albuterol (PROAIR HFA) 90 mcg/actuation inhaler Inhale 2 puffs into the lungs every 6 (six) hours as needed.    albuterol sulfate 90 mcg/actuation AePB Inhale 180 mcg into the lungs every 4 (four) hours. Rescue    albuterol-ipratropium 2.5mg-0.5mg/3mL (DUO-NEB) 0.5 mg-3 mg(2.5 mg base)/3 mL nebulizer solution Take 3 mLs by nebulization every 6 (six) hours as needed for Wheezing. Rescue    alprazolam (XANAX) 1 MG tablet Take 2 tablets (2 mg total) by mouth 2 (two) times daily.    amlodipine (NORVASC) 10 MG tablet Take 1 tablet (10 mg  total) by mouth once daily.    amlodipine (NORVASC) 10 MG tablet Take 10 mg by mouth once daily.    blood sugar diagnostic Strp by Misc.(Non-Drug; Combo Route) route.    budesonide-formoterol 160-4.5 mcg (SYMBICORT) 160-4.5 mcg/actuation HFAA Inhale 2 puffs into the lungs every 12 (twelve) hours.    budesonide-formoterol 160-4.5 mcg (SYMBICORT) 160-4.5 mcg/actuation HFAA Inhale 2 puffs into the lungs every 12 (twelve) hours. Controller    carvedilol (COREG) 6.25 MG tablet Take 6.25 mg by mouth 2 (two) times daily with meals.    clonazePAM (KLONOPIN) 1 MG tablet Take 1 mg by mouth 2 (two) times daily as needed for Anxiety.    ferrous sulfate 324 mg (65 mg iron) TbEC Take 325 mg by mouth once daily.    furosemide (LASIX) 20 MG tablet Take 1 tablet (20 mg total) by mouth 2 (two) times daily.    furosemide (LASIX) 40 MG tablet Take 40 mg by mouth 2 (two) times daily.    hydrALAZINE (APRESOLINE) 50 MG tablet Take 50 mg by mouth 3 (three) times daily.    hydrocodone-acetaminophen 10-325mg (NORCO)  mg Tab Take by mouth.    hydrocodone-acetaminophen 5-325mg (NORCO) 5-325 mg per tablet Take 1 tablet by mouth every 6 (six) hours as needed.    insulin aspart (NOVOLOG) 100 unit/mL InPn pen Inject 12 Units into the skin 3 (three) times daily.    lisinopril 10 MG tablet Take 1 tablet (10 mg total) by mouth once daily.    predniSONE (DELTASONE) 10 MG tablet Take 10 mg by mouth once daily.    promethazine-dextromethorphan (PROMETHAZINE-DM) 6.25-15 mg/5 mL Syrp Take by mouth.    zolpidem (AMBIEN) 5 MG Tab Take 5 mg by mouth nightly as needed.     Family History     None        Social History Main Topics    Smoking status: Former Smoker     Packs/day: 1.00     Years: 40.00     Types: Cigarettes     Quit date: 2/16/2014    Smokeless tobacco: Not on file    Alcohol use Yes    Drug use: No    Sexual activity: Not on file     Review of Systems   Unable to perform ROS: Mental status change     Objective:      Vital Signs (Most Recent):  Temp: 98 °F (36.7 °C) (10/27/17 2344)  Pulse: 72 (10/27/17 2344)  Resp: 15 (10/27/17 2344)  BP: (!) 129/53 (10/27/17 2344)  SpO2: 100 % (10/27/17 2344) Vital Signs (24h Range):  Temp:  [97.5 °F (36.4 °C)-98 °F (36.7 °C)] 98 °F (36.7 °C)  Pulse:  [67-80] 72  Resp:  [12-20] 15  SpO2:  [97 %-100 %] 100 %  BP: (104-136)/(50-75) 129/53        There is no height or weight on file to calculate BMI.    Physical Exam   Constitutional: She appears well-developed and well-nourished. She appears lethargic.  Non-toxic appearance. No distress.   HENT:   Head: Normocephalic and atraumatic.   Eyes: Conjunctivae and EOM are normal. Pupils are equal, round, and reactive to light.   Neck: Neck supple. No JVD present. No thyromegaly present.   Cardiovascular: Normal rate and intact distal pulses.  An irregular rhythm present.  No extrasystoles are present. Exam reveals no gallop and no friction rub.    Murmur (soft pansystolic, mid diastolic) heard.  Pulmonary/Chest: Effort normal and breath sounds normal. No respiratory distress. She has no wheezes. She has no rales.   Abdominal: Soft. Bowel sounds are normal. She exhibits ascites. She exhibits no fluid wave and no abdominal bruit. There is no tenderness.   Musculoskeletal: Normal range of motion. She exhibits edema (trace to BLE). She exhibits no tenderness or deformity.   Neurological: She appears lethargic. She is disoriented. GCS eye subscore is 3. GCS verbal subscore is 2. GCS motor subscore is 5.   Skin: Skin is warm and dry. No rash noted. No erythema.   Nursing note and vitals reviewed.       Significant Labs:   Recent Lab Results       10/27/17  2045 10/27/17  2025      Albumin 2.7(L)      Alkaline Phosphatase 138(H)      (H)      Amorphous, UA  Occasional     Anion Gap 21(H)      Appearance, UA  Clear     (H)      Bacteria, UA  Occasional     Baso # 0.01      Basophil% 0.1      Bilirubin (UA)  1+  Comment:  Positive urine  bilirubin is not confirmed. Correlate with   serum bilirubin and clinical presentation.  (A)     Total Bilirubin 4.4  Comment:  For infants and newborns, interpretation of results should be based  on gestational age, weight and in agreement with clinical  observations.  Premature Infant recommended reference ranges:  Up to 24 hours.............<8.0 mg/dL  Up to 48 hours............<12.0 mg/dL  3-5 days..................<15.0 mg/dL  6-29 days.................<15.0 mg/dL  (H)      BUN, Bld 76(H)      Calcium 8.7      Chloride 96      CO2 18(L)      Color, UA  Yellow     Creatinine 3.6(H)      Differential Method Automated      eGFR if  15(A)      eGFR if non  13  Comment:  Calculation used to obtain the estimated glomerular filtration  rate (eGFR) is the CKD-EPI equation. Since race is unknown   in our information system, the eGFR values for   -American and Non--American patients are given   for each creatinine result.  (A)      Eos # 0.0      Eosinophil% 0.0      Glucose 66(L)      Glucose, UA  Negative     Gran # 10.1(H)      Gran% 77.0(H)      Hematocrit 28.9(L)      Hemoglobin 8.3(L)      Hyaline Casts, UA  0     Ketones, UA  Negative     Leukocytes, UA  1+(A)     Lymph # 1.7      Lymph% 12.9(L)      MCH 22.6(L)      MCHC 28.7(L)      MCV 79(L)      Microscopic Comment  SEE COMMENT  Comment:  Other formed elements not mentioned in the report are not   present in the microscopic examination.        Mono # 1.3(H)      Mono% 10.2      MPV 10.1      Nitrite, UA  Negative     Occult Blood UA  Trace(A)     pH, UA  5.0     Platelets 360(H)      Potassium 6.7  Comment:  critical result(s) called and verbal readback obtained from   K 6.Lambert SMART RN , 10/27/2017 21:28  (HH)      Total Protein 7.4      Protein, UA  2+  Comment:  Recommend a 24 hour urine protein or a urine   protein/creatinine ratio if globulin induced proteinuria is  clinically suspected.  (A)     RBC  3.68(L)      RBC, UA  0     RDW 21.1(H)      Sodium 135(L)      Specific Gravity, UA  1.020     Specimen UA  Urine, Catheterized     Squam Epithel, UA  3     Urobilinogen, UA  2.0-3.0(A)     WBC, UA  4     WBC 13.12(H)      Yeast, UA  Occasional(A)         All pertinent labs within the past 24 hours have been reviewed.    Significant Imaging:   Imaging Results    None        I have reviewed all pertinent imaging results/findings within the past 24 hours.

## 2017-10-28 NOTE — ASSESSMENT & PLAN NOTE
- Potassium 6.7 on admission.  IV calcium gluconate, IV insulin, and Kayexalate given in ED.  - EKG stable, no arrhythmias on telemetry.  Continue monitoring telemetry.  - Admit to Inpatient.  - Follow serial BMP's.  - Continuous IVF's.

## 2017-10-28 NOTE — SUBJECTIVE & OBJECTIVE
Past Medical History:   Diagnosis Date    Anxiety     Asthma     CHF (congestive heart failure)     Chronic pain     Back and Shoulder    COPD (chronic obstructive pulmonary disease)     Diabetes     Diabetes mellitus     Hep C w/o coma, chronic     Hepatitis C     High cholesterol     Hypertension        Past Surgical History:   Procedure Laterality Date    APPENDECTOMY      BACK SURGERY      CHOLECYSTECTOMY      ROTATOR CUFF REPAIR         Review of patient's allergies indicates:   Allergen Reactions    Corticosteroids (glucocorticoids) Shortness Of Breath    Corticosteroids (glucocorticoids) Shortness Of Breath     Per prior information from merged chart.       Family History     None        Social History Main Topics    Smoking status: Former Smoker     Packs/day: 1.00     Years: 40.00     Types: Cigarettes     Quit date: 2/16/2014    Smokeless tobacco: Not on file    Alcohol use Yes    Drug use: No    Sexual activity: Not on file         Review of Systems   Reason unable to perform ROS: limited s/t lethargy.   Respiratory: Positive for shortness of breath and wheezing.    Cardiovascular: Positive for leg swelling. Negative for chest pain.   Gastrointestinal: Negative for nausea and vomiting.     Objective:     Vital Signs (Most Recent):  Temp: 98.1 °F (36.7 °C) (10/28/17 1445)  Pulse: 80 (10/28/17 1600)  Resp: 13 (10/28/17 1600)  BP: (!) 133/52 (10/28/17 1600)  SpO2: (!) 93 % (10/28/17 1600) Vital Signs (24h Range):  Temp:  [97.4 °F (36.3 °C)-98.1 °F (36.7 °C)] 98.1 °F (36.7 °C)  Pulse:  [67-80] 80  Resp:  [10-20] 13  SpO2:  [92 %-100 %] 93 %  BP: ()/(35-75) 133/52     Weight: 94.9 kg (209 lb 3.5 oz)  Body mass index is 40.86 kg/m².      Intake/Output Summary (Last 24 hours) at 10/28/17 1619  Last data filed at 10/28/17 1500   Gross per 24 hour   Intake           751.67 ml   Output              195 ml   Net           556.67 ml       Physical Exam   Constitutional: She appears  well-nourished. She appears lethargic. She has a sickly appearance. She is not intubated. Nasal cannula in place.   HENT:   Head: Normocephalic.   Eyes: Conjunctivae are normal. Pupils are equal, round, and reactive to light.   Neck: No JVD present. No tracheal deviation present.   Cardiovascular: Normal rate and regular rhythm.   No extrasystoles are present.   Murmur heard.   Systolic murmur is present   Pulses:       Radial pulses are 2+ on the right side, and 2+ on the left side.        Dorsalis pedis pulses are 1+ on the right side, and 1+ on the left side.   Positive JVD   Pulmonary/Chest: Effort normal. No accessory muscle usage. She is not intubated. She has decreased breath sounds. She has no wheezes. She has no rhonchi. She has no rales.   Abdominal: Soft. She exhibits no distension. Bowel sounds are decreased. There is no tenderness.   Musculoskeletal: She exhibits no edema.   Neurological: She appears lethargic. GCS eye subscore is 2. GCS verbal subscore is 4. GCS motor subscore is 5.   Skin: Skin is dry. Capillary refill takes more than 3 seconds.       Vents:  Oxygen Concentration (%): 28 (10/28/17 0505)    Lines/Drains/Airways     Drain                 Hemodialysis AV Fistula 10/28/17 1245 Other less than 1 day         Urethral Catheter 10/28/17 0425 Non-latex 16 Fr. less than 1 day          Pressure Ulcer                 Pressure Ulcer 10/28/17 1305 Left other (see comments) Stage II less than 1 day          Peripheral Intravenous Line                 Peripheral IV - Single Lumen 10/27/17 2045 Right Other less than 1 day                Significant Labs:    CBC/Anemia Profile:    Recent Labs  Lab 10/27/17  2045 10/28/17  0652   WBC 13.12* 12.57   HGB 8.3* 8.3*   HCT 28.9* 28.9*   * 249   MCV 79* 77*   RDW 21.1* 20.8*        Chemistries:    Recent Labs  Lab 10/27/17  2045 10/28/17  0133 10/28/17  0652   * 135* 132*   K 6.7* 7.2* 6.7*   CL 96 99 97   CO2 18* 10* 16*   BUN 76* 79* 86*    CREATININE 3.6* 3.7* 3.9*   CALCIUM 8.7 8.4* 8.7   ALBUMIN 2.7*  --  2.6*   PROT 7.4  --  8.3   BILITOT 4.4*  --  3.4*   ALKPHOS 138*  --  129   *  --  154*   *  --  653*   MG  --   --  3.5*       All pertinent labs within the past 24 hours have been reviewed.  [facility]    Recent Labs  Lab 10/28/17  0501   PH 7.297*   PO2 52*   PCO2 45.0   HCO3 22.0*   BE -4     Lab Results   Component Value Date    INR 1.2 06/06/2017    INR 1.1 03/16/2014    INR 1.1 03/04/2014       Significant Imaging:   I have reviewed all pertinent imaging results/findings within the past 24 hours.

## 2017-10-28 NOTE — PROGRESS NOTES
Family at bedside with Meena and Dr. Fernandes and update given and code status discussed. Pt tolerating dialysis.

## 2017-10-28 NOTE — HPI
Patient is a 62-year-old female with advanced mitral stenosis complicated by pulmonary hypertension and severe congestive heart failure.  Patient is inoperable as recommended by cardiothoracic surgery.  Patient was in the [facility] and was offered hospice care with the patient.  According to the records patient refused hospice care.  Patient went to the nursing home one day prior to current admission and comes back to the hospital with severe hyperkalemia with potassium greater than 7 persistent despite medical treatment.  Patient's course also has been complicated by severe liver failure and kidney failure.  Nephrology consultation is requested for the treatment of urgent hyperkalemia, metabolic acidosis, acute kidney injury and multisystem failure

## 2017-10-28 NOTE — ASSESSMENT & PLAN NOTE
- Patient underwent LHC + RHC at [facility] 10/20/17; ? KYLER.  - Creatinine 3.6 (baseline 1.7), K 6.7 on admission.  Labs from [facility] 10/24 with cr. 1.59, K 4.8.  - Calcium gluconate 1gm IV, insulin 10 units IV + D50, and Kayexalate 30gm given in ED.  - EKG without peaked T waves, P wave present with increased OK interval. No arrhythmias on telemetry.   - Will admit to Telemetry.  - Continuous IVF's with 0.9%NS @ 100mL/hr.  - BMP Q4 hours for now.  - Will insert rincon to ensure no retention.  - Check CPK to r/o Rhabdo.  - Renal U/S.  - Nephrology consulted.  If no improvement in K, likely will need CRRT.  - Avoid nephrotoxic agents.

## 2017-10-28 NOTE — PROGRESS NOTES
Pt arrived per stretcher, accompanied by ED nurse. Transfer from stretcher to bed max assist requiring four nurses as pt showed no effort during transfer. Pt unable to answer admit questions. Will wait for family to arrive to complete admission assessment. Will continue to monitor.

## 2017-10-28 NOTE — HPI
Unable to obtain history from patient due to AMS.  History obtained from ED staff, NH records, and medical records reviewed in Epic.  Ms. Nieto is a 63yo AA female with an extensive PMHx of multivessel CAD, severe mitral valve stenosis, severe tricuspid insufficiency, severe secondary pulmonary hypertension, HTN, chronic diastolic HFpEF of 60%, HLD, COPD with chronic respiratory failure on home O2, Hep C, noncompliance, polysubstance abuse with h/o IV drug use, CKD stage III, and DM II, who was transferred from NH to ED due to elevated potassium (7.1) found on routine lab work today.  Patient was on Bumex 2mg daily with no potassium supplementation at NH.  Initial work-up in ED resulted K 6.7, BUN 76, cr. 3.6, T bili 4.4, , , H&H 8.3/29.  She was just DC from [facility] yesterday with K 4.8, cr, 1.59.  Patient received calcium gluconate IV, insulin IV with D50, and Kayexalate in ED.  No arrhythmias on telemetry monitor, EKG stable.  Hospital Medicine was consulted for admission.  Currently, patient appears comfortable, in NAD.  She opens eye to verbal stimuli, but only mumbles incomprehensible words.  It is important to note that patient underwent RHC + LHC at [facility] on 10/20/17.  Patient has numerous prior admissions at [facility] for decompensated HF, CAD, and valvular heart disease (13 admissions in the past 1 year).  Last hospitalized at [facility] 10/9/2017-10/26/2017 for noncompliance/drug overdose.  During that admission, she was evaluated by Cardiology and CVT surgery, and deemed to be poor surgical candidate.  Hospice was recommended, but patient declined.  Subsequently, she was DC to NH facility.

## 2017-10-28 NOTE — SUBJECTIVE & OBJECTIVE
Prescriptions Prior to Admission   Medication Sig Dispense Refill Last Dose    albuterol sulfate 2.5 mg/0.5 mL Nebu Take 2.5 mg by nebulization every 6 (six) hours as needed. Rescue       aspirin (ECOTRIN) 81 MG EC tablet Take 81 mg by mouth once daily.       atorvastatin (LIPITOR) 40 MG tablet Take 40 mg by mouth once daily.       benzonatate (TESSALON) 100 MG capsule Take 100 mg by mouth.       bumetanide (BUMEX) 2 MG tablet Take 2 mg by mouth.       ferrous sulfate 325 (65 FE) MG EC tablet Take 1 tablet (325 mg total) by mouth 2 (two) times daily. 30 tablet 0     fluticasone (FLONASE) 50 mcg/actuation nasal spray 1 spray.       fluticasone-vilanterol (BREO ELLIPTA) 100-25 mcg/dose diskus inhaler Inhale 1 puff into the lungs once daily. Controller       folic acid (FOLVITE) 1 MG tablet Take 1 mg by mouth once daily.       gabapentin (NEURONTIN) 100 MG capsule Take 100 mg by mouth.       insulin glargine (LANTUS) 100 unit/mL injection Inject 10 Units into the skin once daily.    3/3/2014    isosorbide mononitrate (IMDUR) 30 MG 24 hr tablet Take 30 mg by mouth.       metoprolol tartrate (LOPRESSOR) 25 MG tablet Take 1 tablet (25 mg total) by mouth 2 (two) times daily. 60 tablet 0     oxyCODONE-acetaminophen (PERCOCET)  mg per tablet Take 1 tablet by mouth.       senna-docusate 8.6-50 mg (PERICOLACE) 8.6-50 mg per tablet Take 1 tablet by mouth.       traMADol (ULTRAM) 50 mg tablet Take 50 mg by mouth.       blood sugar diagnostic Strp by Misc.(Non-Drug; Combo Route) route.       insulin aspart (NOVOLOG) 100 unit/mL InPn pen Inject 12 Units into the skin 3 (three) times daily. 10.8 mL 11     lisinopril 10 MG tablet Take 1 tablet (10 mg total) by mouth once daily. 30 tablet 0     [DISCONTINUED] albuterol (ACCUNEB) 0.63 mg/3 mL Nebu Take 0.63 mg by nebulization every 6 (six) hours as needed. Rescue       [DISCONTINUED] albuterol (PROAIR HFA) 90 mcg/actuation inhaler Inhale 2 puffs into the  lungs every 6 (six) hours as needed.       [DISCONTINUED] albuterol sulfate 90 mcg/actuation AePB Inhale 180 mcg into the lungs every 4 (four) hours. Rescue       [DISCONTINUED] albuterol-ipratropium 2.5mg-0.5mg/3mL (DUO-NEB) 0.5 mg-3 mg(2.5 mg base)/3 mL nebulizer solution Take 3 mLs by nebulization every 6 (six) hours as needed for Wheezing. Rescue       [DISCONTINUED] alprazolam (XANAX) 1 MG tablet Take 2 tablets (2 mg total) by mouth 2 (two) times daily. 15 tablet 0     [DISCONTINUED] amlodipine (NORVASC) 10 MG tablet Take 1 tablet (10 mg total) by mouth once daily. 15 tablet 0     [DISCONTINUED] amlodipine (NORVASC) 10 MG tablet Take 10 mg by mouth once daily.       [DISCONTINUED] budesonide-formoterol 160-4.5 mcg (SYMBICORT) 160-4.5 mcg/actuation HFAA Inhale 2 puffs into the lungs every 12 (twelve) hours.   3/4/2014    [DISCONTINUED] budesonide-formoterol 160-4.5 mcg (SYMBICORT) 160-4.5 mcg/actuation HFAA Inhale 2 puffs into the lungs every 12 (twelve) hours. Controller       [DISCONTINUED] carvedilol (COREG) 6.25 MG tablet Take 6.25 mg by mouth 2 (two) times daily with meals.       [DISCONTINUED] clonazePAM (KLONOPIN) 1 MG tablet Take 1 mg by mouth 2 (two) times daily as needed for Anxiety.       [DISCONTINUED] ferrous sulfate 324 mg (65 mg iron) TbEC Take 325 mg by mouth once daily.       [DISCONTINUED] furosemide (LASIX) 20 MG tablet Take 1 tablet (20 mg total) by mouth 2 (two) times daily. 30 tablet 0     [DISCONTINUED] furosemide (LASIX) 40 MG tablet Take 40 mg by mouth 2 (two) times daily.       [DISCONTINUED] hydrALAZINE (APRESOLINE) 50 MG tablet Take 50 mg by mouth 3 (three) times daily.       [DISCONTINUED] hydrocodone-acetaminophen 10-325mg (NORCO)  mg Tab Take by mouth.       [DISCONTINUED] hydrocodone-acetaminophen 5-325mg (NORCO) 5-325 mg per tablet Take 1 tablet by mouth every 6 (six) hours as needed. 10 tablet 0     [DISCONTINUED] predniSONE (DELTASONE) 10 MG tablet Take 10  mg by mouth once daily.       [DISCONTINUED] promethazine-dextromethorphan (PROMETHAZINE-DM) 6.25-15 mg/5 mL Syrp Take by mouth.       [DISCONTINUED] zolpidem (AMBIEN) 5 MG Tab Take 5 mg by mouth nightly as needed.          Review of patient's allergies indicates:   Allergen Reactions    Corticosteroids (glucocorticoids) Shortness Of Breath    Corticosteroids (glucocorticoids) Shortness Of Breath     Per prior information from merged chart.       Past Medical History:   Diagnosis Date    Anxiety     Asthma     CHF (congestive heart failure)     Chronic pain     Back and Shoulder    COPD (chronic obstructive pulmonary disease)     Diabetes     Diabetes mellitus     Hep C w/o coma, chronic     Hepatitis C     High cholesterol     Hypertension      Past Surgical History:   Procedure Laterality Date    APPENDECTOMY      BACK SURGERY      CHOLECYSTECTOMY      ROTATOR CUFF REPAIR       Family History     None        Social History Main Topics    Smoking status: Former Smoker     Packs/day: 1.00     Years: 40.00     Types: Cigarettes     Quit date: 2/16/2014    Smokeless tobacco: Not on file    Alcohol use Yes    Drug use: No    Sexual activity: Not on file     Review of Systems   Unable to perform ROS: Acuity of condition   All other systems reviewed and are negative.    Objective:     Vital Signs (Most Recent):  Temp: 97.4 °F (36.3 °C) (10/28/17 0812)  Pulse: 69 (10/28/17 0812)  Resp: 20 (10/28/17 0812)  BP: 116/65 (10/28/17 0812)  SpO2: 95 % (10/28/17 0812) Vital Signs (24h Range):  Temp:  [97.4 °F (36.3 °C)-98 °F (36.7 °C)] 97.4 °F (36.3 °C)  Pulse:  [67-80] 69  Resp:  [12-20] 20  SpO2:  [94 %-100 %] 95 %  BP: (104-136)/(49-75) 116/65     Weight: 95.9 kg (211 lb 6.7 oz)  Body mass index is 41.29 kg/m².    Physical Exam   Constitutional: She appears well-developed.   HENT:   Head: Normocephalic.   Eyes: Pupils are equal, round, and reactive to light.   Neck: Normal range of motion.    Cardiovascular:   Murmur heard.  Pulmonary/Chest: She is in respiratory distress.   Abdominal: She exhibits distension.   Musculoskeletal: She exhibits edema.   Neurological: Coordination abnormal.   Skin: Skin is warm.   Nursing note and vitals reviewed.      Significant Labs:  All pertinent labs from the last 24 hours have been reviewed.    Significant Diagnostics:  I have reviewed all pertinent imaging results/findings within the past 24 hours.

## 2017-10-28 NOTE — HPI
62F w/ complex medical issues including severe pulm HTN, mitral stenosis, CAD, polysubstance abuse recent admissions to [facility] for heart failure. There she was deemed a high risk surgical candidate for valve repair or CABG.  She is here with altered mentation, elevated ammonia, renal failure and worsening LFTs. Outside notes and records reviewed. AST and ALT were previously <100 now AST ~600 and ALT ~150.  Patient is encephalopathic unable to provide history.  She had HD catheter placed with plans for dialysis.

## 2017-10-28 NOTE — CONSULTS
[facility] -   Vascular Surgery  Consult Note    Consults Nathanael Fernandes  Subjective:     Chief Complaint/Reason for Admission: AMS    History of Present Illness: Unable to obtain history from patient due to AMS.  History obtained from ED staff, NH records, and medical records reviewed in Epic.  Ms. Nieto is a 61yo AA female with an extensive PMHx of multivessel CAD, severe mitral valve stenosis, severe tricuspid insufficiency, severe secondary pulmonary hypertension, HTN, chronic diastolic HFpEF of 60%, HLD, COPD with chronic respiratory failure on home O2, Hep C, noncompliance, polysubstance abuse with h/o IV drug use, CKD stage III, and DM II, who was transferred from NH to ED due to elevated potassium (7.1) found on routine lab work today.  Patient was on Bumex 2mg daily with no potassium supplementation at NH.  Initial work-up in ED resulted K 6.7, BUN 76, cr. 3.6, T bili 4.4, , , H&H 8.3/29.  She was just DC from [facility] yesterday with K 4.8, cr, 1.59.  Patient received calcium gluconate IV, insulin IV with D50, and Kayexalate in ED.  No arrhythmias on telemetry monitor, EKG stable.  Hospital Medicine was consulted for admission.  Currently, patient appears comfortable, in NAD.  She opens eye to verbal stimuli, but only mumbles incomprehensible words.  It is important to note that patient underwent RHC + LHC at [facility] on 10/20/17.  Patient has numerous prior admissions at [facility] for decompensated HF, CAD, and valvular heart disease (13 admissions in the past 1 year).  Last hospitalized at [facility] 10/9/2017-10/26/2017 for noncompliance/drug overdose.  During that admission, she was evaluated by Cardiology and CVT surgery, and deemed to be poor surgical candidate.  Hospice was recommended, but patient declined.  Subsequently, she was DC to NH facility.    Prescriptions Prior to Admission   Medication Sig Dispense Refill Last Dose    albuterol sulfate 2.5 mg/0.5 mL Nebu Take 2.5 mg by nebulization  every 6 (six) hours as needed. Rescue       aspirin (ECOTRIN) 81 MG EC tablet Take 81 mg by mouth once daily.       atorvastatin (LIPITOR) 40 MG tablet Take 40 mg by mouth once daily.       benzonatate (TESSALON) 100 MG capsule Take 100 mg by mouth.       bumetanide (BUMEX) 2 MG tablet Take 2 mg by mouth.       ferrous sulfate 325 (65 FE) MG EC tablet Take 1 tablet (325 mg total) by mouth 2 (two) times daily. 30 tablet 0     fluticasone (FLONASE) 50 mcg/actuation nasal spray 1 spray.       fluticasone-vilanterol (BREO ELLIPTA) 100-25 mcg/dose diskus inhaler Inhale 1 puff into the lungs once daily. Controller       folic acid (FOLVITE) 1 MG tablet Take 1 mg by mouth once daily.       gabapentin (NEURONTIN) 100 MG capsule Take 100 mg by mouth.       insulin glargine (LANTUS) 100 unit/mL injection Inject 10 Units into the skin once daily.    3/3/2014    isosorbide mononitrate (IMDUR) 30 MG 24 hr tablet Take 30 mg by mouth.       metoprolol tartrate (LOPRESSOR) 25 MG tablet Take 1 tablet (25 mg total) by mouth 2 (two) times daily. 60 tablet 0     oxyCODONE-acetaminophen (PERCOCET)  mg per tablet Take 1 tablet by mouth.       senna-docusate 8.6-50 mg (PERICOLACE) 8.6-50 mg per tablet Take 1 tablet by mouth.       traMADol (ULTRAM) 50 mg tablet Take 50 mg by mouth.       blood sugar diagnostic Strp by Misc.(Non-Drug; Combo Route) route.       insulin aspart (NOVOLOG) 100 unit/mL InPn pen Inject 12 Units into the skin 3 (three) times daily. 10.8 mL 11     lisinopril 10 MG tablet Take 1 tablet (10 mg total) by mouth once daily. 30 tablet 0     [DISCONTINUED] albuterol (ACCUNEB) 0.63 mg/3 mL Nebu Take 0.63 mg by nebulization every 6 (six) hours as needed. Rescue       [DISCONTINUED] albuterol (PROAIR HFA) 90 mcg/actuation inhaler Inhale 2 puffs into the lungs every 6 (six) hours as needed.       [DISCONTINUED] albuterol sulfate 90 mcg/actuation AePB Inhale 180 mcg into the lungs every 4 (four)  hours. Rescue       [DISCONTINUED] albuterol-ipratropium 2.5mg-0.5mg/3mL (DUO-NEB) 0.5 mg-3 mg(2.5 mg base)/3 mL nebulizer solution Take 3 mLs by nebulization every 6 (six) hours as needed for Wheezing. Rescue       [DISCONTINUED] alprazolam (XANAX) 1 MG tablet Take 2 tablets (2 mg total) by mouth 2 (two) times daily. 15 tablet 0     [DISCONTINUED] amlodipine (NORVASC) 10 MG tablet Take 1 tablet (10 mg total) by mouth once daily. 15 tablet 0     [DISCONTINUED] amlodipine (NORVASC) 10 MG tablet Take 10 mg by mouth once daily.       [DISCONTINUED] budesonide-formoterol 160-4.5 mcg (SYMBICORT) 160-4.5 mcg/actuation HFAA Inhale 2 puffs into the lungs every 12 (twelve) hours.   3/4/2014    [DISCONTINUED] budesonide-formoterol 160-4.5 mcg (SYMBICORT) 160-4.5 mcg/actuation HFAA Inhale 2 puffs into the lungs every 12 (twelve) hours. Controller       [DISCONTINUED] carvedilol (COREG) 6.25 MG tablet Take 6.25 mg by mouth 2 (two) times daily with meals.       [DISCONTINUED] clonazePAM (KLONOPIN) 1 MG tablet Take 1 mg by mouth 2 (two) times daily as needed for Anxiety.       [DISCONTINUED] ferrous sulfate 324 mg (65 mg iron) TbEC Take 325 mg by mouth once daily.       [DISCONTINUED] furosemide (LASIX) 20 MG tablet Take 1 tablet (20 mg total) by mouth 2 (two) times daily. 30 tablet 0     [DISCONTINUED] furosemide (LASIX) 40 MG tablet Take 40 mg by mouth 2 (two) times daily.       [DISCONTINUED] hydrALAZINE (APRESOLINE) 50 MG tablet Take 50 mg by mouth 3 (three) times daily.       [DISCONTINUED] hydrocodone-acetaminophen 10-325mg (NORCO)  mg Tab Take by mouth.       [DISCONTINUED] hydrocodone-acetaminophen 5-325mg (NORCO) 5-325 mg per tablet Take 1 tablet by mouth every 6 (six) hours as needed. 10 tablet 0     [DISCONTINUED] predniSONE (DELTASONE) 10 MG tablet Take 10 mg by mouth once daily.       [DISCONTINUED] promethazine-dextromethorphan (PROMETHAZINE-DM) 6.25-15 mg/5 mL Syrp Take by mouth.        [DISCONTINUED] zolpidem (AMBIEN) 5 MG Tab Take 5 mg by mouth nightly as needed.          Review of patient's allergies indicates:   Allergen Reactions    Corticosteroids (glucocorticoids) Shortness Of Breath    Corticosteroids (glucocorticoids) Shortness Of Breath     Per prior information from merged chart.       Past Medical History:   Diagnosis Date    Anxiety     Asthma     CHF (congestive heart failure)     Chronic pain     Back and Shoulder    COPD (chronic obstructive pulmonary disease)     Diabetes     Diabetes mellitus     Hep C w/o coma, chronic     Hepatitis C     High cholesterol     Hypertension      Past Surgical History:   Procedure Laterality Date    APPENDECTOMY      BACK SURGERY      CHOLECYSTECTOMY      ROTATOR CUFF REPAIR       Family History     None        Social History Main Topics    Smoking status: Former Smoker     Packs/day: 1.00     Years: 40.00     Types: Cigarettes     Quit date: 2/16/2014    Smokeless tobacco: Not on file    Alcohol use Yes    Drug use: No    Sexual activity: Not on file     Review of Systems   Unable to perform ROS: Acuity of condition   All other systems reviewed and are negative.    Objective:     Vital Signs (Most Recent):  Temp: 97.4 °F (36.3 °C) (10/28/17 0812)  Pulse: 69 (10/28/17 0812)  Resp: 20 (10/28/17 0812)  BP: 116/65 (10/28/17 0812)  SpO2: 95 % (10/28/17 0812) Vital Signs (24h Range):  Temp:  [97.4 °F (36.3 °C)-98 °F (36.7 °C)] 97.4 °F (36.3 °C)  Pulse:  [67-80] 69  Resp:  [12-20] 20  SpO2:  [94 %-100 %] 95 %  BP: (104-136)/(49-75) 116/65     Weight: 95.9 kg (211 lb 6.7 oz)  Body mass index is 41.29 kg/m².    Physical Exam   Constitutional: She appears well-developed.   HENT:   Head: Normocephalic.   Eyes: Pupils are equal, round, and reactive to light.   Neck: Normal range of motion.   Cardiovascular:   Murmur heard.  Pulmonary/Chest: She is in respiratory distress.   Abdominal: She exhibits distension.   Musculoskeletal: She  exhibits edema.   Neurological: Coordination abnormal.   Skin: Skin is warm.   Nursing note and vitals reviewed.      Significant Labs:  All pertinent labs from the last 24 hours have been reviewed.    Significant Diagnostics:  I have reviewed all pertinent imaging results/findings within the past 24 hours.    Assessment/Plan:     * Acute renal failure on CKD stage 3 with hyperkalemia    Needs emergent vas catheter            Thank you for your consult. will place vas cahteter now    Remington Perla MD  Vascular Surgery  [facility]

## 2017-10-28 NOTE — PLAN OF CARE
Problem: Patient Care Overview  Goal: Plan of Care Review  Outcome: Ongoing (interventions implemented as appropriate)  Reviewed POC, including indications and possible side effects of administered medications. Pt unable to verbalize understanding or teach back. Critical potassium received from Lab, communicated to hospitalist; no new orders at this time. Pt remains free from injury. Healed ulcer noted to left heel. Will continue to monitor.    12 hour chart check complete.

## 2017-10-28 NOTE — CONSULTS
[facility] -   Critical Care Medicine  Consult Note    Patient Name: Becki Nieto  MRN: [id number]  Admission Date: 10/27/2017  Hospital Length of Stay: 1 days  Code Status: Full Code  Attending Physician: Noel Reyes MD   Primary Care Provider: Noel Winchester MD   Principal Problem: Acute renal failure superimposed on stage 3 chronic kidney disease      Subjective:     HPI:  62 year old female with PMH of CKD, Hep C, DM2, HTN, HLD, COPD on home oxygen, and chronic diastolic heart failure with preserved EF, secondary pulmonary HTN, severe MV stenosis, and severe tricuspid insufficiency along with IV drug abuse; she was admitted at [facility] from 10/9/17-10/26/17 for IV drug overdose during that hospitalization she underwent evaluation by cardiology and CVT surgery re:heart failure, valvular disease, she was denied surgical intervention s/t ongoing IV drug abuse and hospice care was recommended, she declined hospice care and was discharged to NH care  10/27 she presented to ED from NH s/t elevated potassium on NH labs  In ED pt is obtunded with incomprehensible speech and evaluation revealed K 6.7, creatinine 3.6, elevated liver enzymes    Hospital/ICU Course:  10/27 - admitted to telemetry after given calcium gluconate, iv insulin and D50, and kayexalate given in ED    Today she is being transferred to ICU after emergent placement of vas cath for immediate HD for filtration of potassium, lactate after am labs revealed continue hyperkalemia, worsening creatinine, and lactate 7.6     Past Medical History:   Diagnosis Date    Anxiety     Asthma     CHF (congestive heart failure)     Chronic pain     Back and Shoulder    COPD (chronic obstructive pulmonary disease)     Diabetes     Diabetes mellitus     Hep C w/o coma, chronic     Hepatitis C     High cholesterol     Hypertension        Past Surgical History:   Procedure Laterality Date    APPENDECTOMY      BACK SURGERY      CHOLECYSTECTOMY       ROTATOR CUFF REPAIR         Review of patient's allergies indicates:   Allergen Reactions    Corticosteroids (glucocorticoids) Shortness Of Breath    Corticosteroids (glucocorticoids) Shortness Of Breath     Per prior information from merged chart.       Family History     None        Social History Main Topics    Smoking status: Former Smoker     Packs/day: 1.00     Years: 40.00     Types: Cigarettes     Quit date: 2/16/2014    Smokeless tobacco: Not on file    Alcohol use Yes    Drug use: No    Sexual activity: Not on file         Review of Systems   Reason unable to perform ROS: limited s/t lethargy.   Respiratory: Negative for shortness of breath.    Cardiovascular: Negative for chest pain.   Gastrointestinal: Negative for nausea and vomiting.     Objective:     Vital Signs (Most Recent):  Temp: 98.1 °F (36.7 °C) (10/28/17 1445)  Pulse: 76 (10/28/17 1530)  Resp: 12 (10/28/17 1530)  BP: (!) 130/52 (10/28/17 1530)  SpO2: (!) 94 % (10/28/17 1530) Vital Signs (24h Range):  Temp:  [97.4 °F (36.3 °C)-98.1 °F (36.7 °C)] 98.1 °F (36.7 °C)  Pulse:  [67-80] 76  Resp:  [10-20] 12  SpO2:  [92 %-100 %] 94 %  BP: ()/(35-75) 130/52     Weight: 94.9 kg (209 lb 3.5 oz)  Body mass index is 40.86 kg/m².      Intake/Output Summary (Last 24 hours) at 10/28/17 1559  Last data filed at 10/28/17 1500   Gross per 24 hour   Intake           751.67 ml   Output              195 ml   Net           556.67 ml       Physical Exam   Constitutional: She appears well-nourished. She appears lethargic. She has a sickly appearance. She is not intubated. Nasal cannula in place.   HENT:   Head: Normocephalic.   Eyes: Conjunctivae are normal. Pupils are equal, round, and reactive to light.   Neck: No JVD present. No tracheal deviation present.   Cardiovascular: Normal rate and regular rhythm.   No extrasystoles are present.   Murmur heard.   Systolic murmur is present   Pulses:       Radial pulses are 2+ on the right side, and 2+ on the  left side.        Dorsalis pedis pulses are 1+ on the right side, and 1+ on the left side.   Pulmonary/Chest: Effort normal. No accessory muscle usage. She is not intubated. She has decreased breath sounds. She has no wheezes. She has no rhonchi. She has no rales.   Abdominal: Soft. She exhibits no distension. Bowel sounds are decreased. There is no tenderness.   Musculoskeletal: She exhibits no edema.   Neurological: She appears lethargic. GCS eye subscore is 2. GCS verbal subscore is 4. GCS motor subscore is 5.   Skin: Skin is dry. Capillary refill takes more than 3 seconds.       Vents:  Oxygen Concentration (%): 28 (10/28/17 0505)    Lines/Drains/Airways     Drain                 Hemodialysis AV Fistula 10/28/17 1245 Other less than 1 day         Urethral Catheter 10/28/17 0425 Non-latex 16 Fr. less than 1 day          Pressure Ulcer                 Pressure Ulcer 10/28/17 1305 Left other (see comments) Stage II less than 1 day          Peripheral Intravenous Line                 Peripheral IV - Single Lumen 10/27/17 2045 Right Other less than 1 day                Significant Labs:    CBC/Anemia Profile:    Recent Labs  Lab 10/27/17  2045 10/28/17  0652   WBC 13.12* 12.57   HGB 8.3* 8.3*   HCT 28.9* 28.9*   * 249   MCV 79* 77*   RDW 21.1* 20.8*        Chemistries:    Recent Labs  Lab 10/27/17  2045 10/28/17  0133 10/28/17  0652   * 135* 132*   K 6.7* 7.2* 6.7*   CL 96 99 97   CO2 18* 10* 16*   BUN 76* 79* 86*   CREATININE 3.6* 3.7* 3.9*   CALCIUM 8.7 8.4* 8.7   ALBUMIN 2.7*  --  2.6*   PROT 7.4  --  8.3   BILITOT 4.4*  --  3.4*   ALKPHOS 138*  --  129   *  --  154*   *  --  653*   MG  --   --  3.5*       All pertinent labs within the past 24 hours have been reviewed.  ABG    Recent Labs  Lab 10/28/17  0501   PH 7.297*   PO2 52*   PCO2 45.0   HCO3 22.0*   BE -4     Lab Results   Component Value Date    INR 1.2 06/06/2017    INR 1.1 03/16/2014    INR 1.1 03/04/2014       Significant  Imaging:   I have reviewed all pertinent imaging results/findings within the past 24 hours.    Assessment/Plan:       Problem   Metabolic Acidosis   Hyperkalemia   Acute renal failure on CKD stage 3 with hyperkalemia   Chronic Diastolic Heart Failure Due to Valvular Disease   Encephalopathy, Metabolic   Subacute Liver Failure Without Hepatic Coma   Severe Mitral Valve Stenosis   Type 2 Diabetes Mellitus With Kidney Complication, With Long-Term Current Use of Insulin   Essential Hypertension   Copd (Chronic Obstructive Pulmonary Disease)     1. Neuro: monitoring  2. Pulmonary: supplemental oxygen to keep sat > 92; optimize nebs; encourage TCDB  3. Cardiac: continue coreg, monitor hemodynamics  4. Renal: HD per nephrology  5. Infectious Disease: sepsis surveillance  6. Hematology/Oncology: repeat INR post HD  7. Endocrine: low dose SSI prn with monitoring for glucose control and prevention of insulin toxicity  8. Fluids/Electrolytes/Nutrition/GI: electrolyte management   9. Musculoskeletal:  ROM  10. Pain Management: prn, avoid sedating meds with encephalopathy  11. Discharge and Palliative Care: grim prognosis, recommend hospice transition; discussed with daughter who needs to discuss further with siblings    Preventive Measures:   Nutrition: NPO until more alert  Stress Ulcer: pepcid  DVT: LMWH/SCDs  Beta Blocker for CAD history:   Bowel Prophylaxis: on lactulose for elevated ammonia  Head of Bed/Reposition: Elevate HOB and turn Q1-2 hours    Mobility: OOB once mental status improves  Vas Cath Day: 1  Peguero Day: 1  Immunizations: influenza and pneumococcal vaccinations up to date  Surrogate Decision Maker: daughter  Code Status: full    Counseling/Consultation:I have discussed the care of this patient in detail with the nursing staff and Dr. Ibarra.    Critical Care Time 53 minutes  Critical secondary to encephalopathy, hyperkalemia, acute on chronic heart failure with secondary lactic acidosis, worsening renal  failure, and liver failure  Critical care was time spent personally by me on the following activities: development of treatment plan with patient or surrogate and bedside caregivers, discussions with consultants, evaluation of patient's response to treatment, examination of patient, ordering and performing treatments and interventions, ordering and review of laboratory studies, ordering and review of radiographic studies, pulse oximetry, and re-evaluation of patient's condition.  This critical care time did not overlap with that of any other provider.    Thank you ABI Kennedy for this consult and the pleasure of evaluating and caring for this patient    Meena Cullen [facility]

## 2017-10-28 NOTE — ASSESSMENT & PLAN NOTE
Patient has multiorgan failure with acute kidney injury, advanced metabolic acidosis, acute liver failure due to congestive heart failure as well as severe hyperkalemia which is not responding to medical management.  Patient is critical.  I have talked to the patient wants to continue and proceed with urgent hemodialysis.  I have had a lengthy discussion with hospital medicine Ms. Bergman-FNMIKY, ICU staff with Meena Rubio, Dr. Perla with vascular surgery as well as Dr. Duke with hepatology.  If the patient does not tolerate dialysis her prognosis extremely poor.  If patient does not tolerate regular dialysis she will not be a candidate for any further interventions.    Critical care time spent today is about 40 minutes total in multiple visits.  Greater than 50% of the time was spent in counseling with the patient and  discussing the therapeutic options with all specialties.

## 2017-10-28 NOTE — ASSESSMENT & PLAN NOTE
- BP well controlled without antihypertensives.  - Monitor BP trends, and resume appropriate BP meds as needed.

## 2017-10-28 NOTE — ED TRIAGE NOTES
Pt sent here from [facility] for evaluation of worsening lab values since discharge from [facility] yesterday.

## 2017-10-28 NOTE — SUBJECTIVE & OBJECTIVE
Past Medical History:   Diagnosis Date    Anxiety     Asthma     CHF (congestive heart failure)     Chronic pain     Back and Shoulder    COPD (chronic obstructive pulmonary disease)     Diabetes     Diabetes mellitus     Hep C w/o coma, chronic     Hepatitis C     High cholesterol     Hypertension        Past Surgical History:   Procedure Laterality Date    APPENDECTOMY      BACK SURGERY      CHOLECYSTECTOMY      ROTATOR CUFF REPAIR         Review of patient's allergies indicates:   Allergen Reactions    Corticosteroids (glucocorticoids) Shortness Of Breath    Corticosteroids (glucocorticoids) Shortness Of Breath     Per prior information from merged chart.     Family History     None        Social History Main Topics    Smoking status: Former Smoker     Packs/day: 1.00     Years: 40.00     Types: Cigarettes     Quit date: 2/16/2014    Smokeless tobacco: Not on file    Alcohol use Yes    Drug use: No    Sexual activity: Not on file     Review of Systems   Unable to perform ROS: Mental status change     Objective:     Vital Signs (Most Recent):  Temp: 97.4 °F (36.3 °C) (10/28/17 0812)  Pulse: 69 (10/28/17 0812)  Resp: 20 (10/28/17 0812)  BP: 116/65 (10/28/17 0812)  SpO2: 95 % (10/28/17 0812) Vital Signs (24h Range):  Temp:  [97.4 °F (36.3 °C)-98 °F (36.7 °C)] 97.4 °F (36.3 °C)  Pulse:  [67-80] 69  Resp:  [12-20] 20  SpO2:  [94 %-100 %] 95 %  BP: (104-136)/(49-75) 116/65     Weight: 95.9 kg (211 lb 6.7 oz) (10/28/17 0400)  Body mass index is 41.29 kg/m².      Intake/Output Summary (Last 24 hours) at 10/28/17 1315  Last data filed at 10/28/17 1004   Gross per 24 hour   Intake           751.67 ml   Output              175 ml   Net           576.67 ml       Lines/Drains/Airways     Drain                 Hemodialysis AV Fistula 10/28/17 1245 Other less than 1 day         Urethral Catheter 10/28/17 0425 Non-latex 16 Fr. less than 1 day          Peripheral Intravenous Line                 Peripheral  IV - Single Lumen 10/27/17 2045 Right Other less than 1 day                Physical Exam   Constitutional: She appears well-developed and well-nourished. No distress.   HENT:   Head: Normocephalic and atraumatic.   Mouth/Throat: Oropharynx is clear and moist. No oropharyngeal exudate.   Eyes: Conjunctivae are normal. Pupils are equal, round, and reactive to light. Right eye exhibits no discharge. Left eye exhibits no discharge. Scleral icterus is present.   Pulmonary/Chest: Effort normal. No respiratory distress. She has no wheezes.   Coarse, distant BS bilaterally   Abdominal: Soft. She exhibits no distension. There is no tenderness.   Musculoskeletal: She exhibits edema (1+).   Neurological: She is alert.   Not oriented, +asterixis   Vitals reviewed.      Significant Labs:  CBC:   Recent Labs  Lab 10/27/17  2045 10/28/17  0652   WBC 13.12* 12.57   HGB 8.3* 8.3*   HCT 28.9* 28.9*   * 249     CMP:   Recent Labs  Lab 10/28/17  0652   *   CALCIUM 8.7   ALBUMIN 2.6*   PROT 8.3   *   K 6.7*   CO2 16*   CL 97   BUN 86*   CREATININE 3.9*   ALKPHOS 129   *   *   BILITOT 3.4*     Coagulation: No results for input(s): INR, APTT in the last 48 hours.    Invalid input(s): PT    Significant Imaging:  CT: I have reviewed all results within the past 24 hours and my personal findings are:  no acute findings on head CT

## 2017-10-28 NOTE — PROGRESS NOTES
Assumed care from abdiel ayon. No changes in assessment. Pt confused to place and time. Follows simple commands. Dialysis in progress.

## 2017-10-28 NOTE — SUBJECTIVE & OBJECTIVE
Interval History: pt noted to have decreased orientation with elevated ammonia and liver enzymes noted.  Lactulose initiated with GI consulted.  Creatinine 3.6>>3.9 with Nephrology following.  Vascular Surgery consulted for Vas Cath placement with HD initiated.  Pt transferred to ICU for critical management.      Review of Systems   Unable to perform ROS: Mental status change     Objective:     Vital Signs (Most Recent):  Temp: 98 °F (36.7 °C) (10/28/17 1600)  Pulse: 90 (10/28/17 1830)  Resp: 14 (10/28/17 1830)  BP: (!) 150/55 (10/28/17 1830)  SpO2: 97 % (10/28/17 1830) Vital Signs (24h Range):  Temp:  [97.4 °F (36.3 °C)-98.1 °F (36.7 °C)] 98 °F (36.7 °C)  Pulse:  [67-95] 90  Resp:  [10-21] 14  SpO2:  [92 %-100 %] 97 %  BP: ()/(35-75) 150/55     Weight: 94.9 kg (209 lb 3.5 oz)  Body mass index is 40.86 kg/m².    Intake/Output Summary (Last 24 hours) at 10/28/17 1848  Last data filed at 10/28/17 1800   Gross per 24 hour   Intake           751.67 ml   Output              200 ml   Net           551.67 ml      Physical Exam   Constitutional: She appears well-developed and well-nourished.   HENT:   Head: Normocephalic.   Nose: Nose normal.   Mouth/Throat: Oropharynx is clear and moist.   Eyes: Conjunctivae and EOM are normal.   Neck: Normal range of motion. JVD present.   Cardiovascular: Normal rate, regular rhythm, normal heart sounds and intact distal pulses.    No murmur heard.  Pulmonary/Chest: No respiratory distress. She has wheezes. She has rales.   Abdominal: Soft. Bowel sounds are normal. She exhibits no distension. There is no tenderness.   Genitourinary:   Genitourinary Comments: Deferred   Neurological: She is alert.   Oriented to self   Skin: Skin is warm and dry.   Psychiatric: She has a normal mood and affect. Her speech is delayed. She is withdrawn. Cognition and memory are impaired.       Significant Labs:   CBC:   Recent Labs  Lab 10/27/17  2045 10/28/17  0652   WBC 13.12* 12.57   HGB 8.3* 8.3*    HCT 28.9* 28.9*   * 249     CMP:   Recent Labs  Lab 10/27/17  2045 10/28/17  0133 10/28/17  0652   * 135* 132*   K 6.7* 7.2* 6.7*   CL 96 99 97   CO2 18* 10* 16*   GLU 66* 90 116*   BUN 76* 79* 86*   CREATININE 3.6* 3.7* 3.9*   CALCIUM 8.7 8.4* 8.7   PROT 7.4  --  8.3   ALBUMIN 2.7*  --  2.6*   BILITOT 4.4*  --  3.4*   ALKPHOS 138*  --  129   *  --  653*   *  --  154*   ANIONGAP 21* 26* 19*   EGFRNONAA 13* 12* 12*       Significant Imaging:   Imaging Results          X-Ray Chest 1 View (Final result)  Result time 10/28/17 15:11:31    Final result by Joe Acosta Jr., MD (10/28/17 15:11:31)                 Impression:     CHF exacerbation.      Electronically signed by: JOE ACOSTA  Date:     10/28/17  Time:    15:11              Narrative:     Exam: Portable chest radiograph    History:    Shortness of breath    Findings: Left IJ Vas-Cath distal tips in the high right atrium.  Cardiomegaly.  Small pleural effusions.  Vascular congestion with interstitial edema.  No pneumothorax.                             IR Heart Cath Images (In process)                CT Head Without Contrast (Final result)  Result time 10/28/17 09:25:54    Final result by Joe Acosta Jr., MD (10/28/17 09:25:54)                 Impression:          No acute intracranial process.  Limited study due to motion.    All CT scan at this facility use dose modulation, iterative reconstruction, and/or weight base dosing when appropriate to reduce radiation dose to as low as reasonably achievable.      Electronically signed by: JOE ACOSTA  Date:     10/28/17  Time:    09:25              Narrative:    Reason: Altered mental status    Technique: Head CT without IV contrast.    Comparisons: <None.>    Findings:      Study is limited due to motion.  Benign appearing basal ganglia calcifications.  Atrophy with periventricular white matter changes consistent with small vessel ischemic change.  Old lacunar  infarct in the left internal capsule and deep left frontal white matter.  No obvious extra-axial acute fluid collection.  Normal gray-white differentiation without hemorrhage, mass effect, or midline shift. Ventricles and cisterns are within normal limits. No other cerebral or cerebellar abnormalities.  Paranasal sinuses and mastoid air cells are clear.                             US Retroperitoneal Complete (Kidney and (Final result)  Result time 10/28/17 09:12:08    Final result by Joe Acosta Jr., MD (10/28/17 09:12:08)                 Impression:        Normal right kidney.  Left kidney is obscured due to bowel gas.  Collapsed bladder due to Peguero catheter.      Electronically signed by: JOE ACOSTA  Date:     10/28/17  Time:    09:12              Narrative:    Exam: Complete retroperitoneal ultrasound    Clinical Indication: Renal failure    Findings: Multiple sonographic grayscale and Doppler images were obtained of the kidneys and bladder.  The bladder is empty with a Peguero catheter in place.  Right kidney measures 9.1 cm with normal echogenicity and no focal abnormalities.  No renal calculus or hydronephrosis.  Left kidney is obscured due to bowel gas.

## 2017-10-28 NOTE — CONSULTS
[facility] -   Nephrology  Consult Note    Patient Name: Becki Nieto  MRN: [id number]  Admission Date: 10/27/2017  Hospital Length of Stay: 1 days  Attending Provider: Noel Reyes MD   Primary Care Physician: Noel Winchester MD  Principal Problem:Acute renal failure superimposed on stage 3 chronic kidney disease    Consults  Subjective:     HPI: Patient is a 62-year-old female with advanced mitral stenosis complicated by pulmonary hypertension and severe congestive heart failure.  Patient is inoperable as recommended by cardiothoracic surgery.  Patient was in the hospital at [facility] and was offered hospice care with the patient.  According to the records patient refused hospice care.  Patient went to the nursing home one day prior to current admission and comes back to the hospital with severe hyperkalemia with potassium greater than 7 persistent despite medical treatment.  Patient's course also has been complicated by severe liver failure and kidney failure.  Nephrology consultation is requested for the treatment of urgent hyperkalemia, metabolic acidosis, acute kidney injury and multisystem failure    Past Medical History:   Diagnosis Date    Anxiety     Asthma     CHF (congestive heart failure)     Chronic pain     Back and Shoulder    COPD (chronic obstructive pulmonary disease)     Diabetes     Diabetes mellitus     Hep C w/o coma, chronic     Hepatitis C     High cholesterol     Hypertension        Past Surgical History:   Procedure Laterality Date    APPENDECTOMY      BACK SURGERY      CHOLECYSTECTOMY      ROTATOR CUFF REPAIR         Review of patient's allergies indicates:   Allergen Reactions    Corticosteroids (glucocorticoids) Shortness Of Breath    Corticosteroids (glucocorticoids) Shortness Of Breath     Per prior information from merged chart.       Family History     None        Social History Main Topics    Smoking status: Former Smoker     Packs/day: 1.00     Years:  40.00     Types: Cigarettes     Quit date: 2/16/2014    Smokeless tobacco: Not on file    Alcohol use Yes    Drug use: No    Sexual activity: Not on file         Review of Systems   Reason unable to perform ROS: limited s/t lethargy.   Respiratory: Positive for shortness of breath and wheezing.    Cardiovascular: Positive for leg swelling. Negative for chest pain.   Gastrointestinal: Negative for nausea and vomiting.     Objective:     Vital Signs (Most Recent):  Temp: 98.1 °F (36.7 °C) (10/28/17 1445)  Pulse: 80 (10/28/17 1600)  Resp: 13 (10/28/17 1600)  BP: (!) 133/52 (10/28/17 1600)  SpO2: (!) 93 % (10/28/17 1600) Vital Signs (24h Range):  Temp:  [97.4 °F (36.3 °C)-98.1 °F (36.7 °C)] 98.1 °F (36.7 °C)  Pulse:  [67-80] 80  Resp:  [10-20] 13  SpO2:  [92 %-100 %] 93 %  BP: ()/(35-75) 133/52     Weight: 94.9 kg (209 lb 3.5 oz)  Body mass index is 40.86 kg/m².      Intake/Output Summary (Last 24 hours) at 10/28/17 1619  Last data filed at 10/28/17 1500   Gross per 24 hour   Intake           751.67 ml   Output              195 ml   Net           556.67 ml       Physical Exam   Constitutional: She appears well-nourished. She appears lethargic. She has a sickly appearance. She is not intubated. Nasal cannula in place.   HENT:   Head: Normocephalic.   Eyes: Conjunctivae are normal. Pupils are equal, round, and reactive to light.   Neck: No JVD present. No tracheal deviation present.   Cardiovascular: Normal rate and regular rhythm.   No extrasystoles are present.   Murmur heard.   Systolic murmur is present   Pulses:       Radial pulses are 2+ on the right side, and 2+ on the left side.        Dorsalis pedis pulses are 1+ on the right side, and 1+ on the left side.   Positive JVD   Pulmonary/Chest: Effort normal. No accessory muscle usage. She is not intubated. She has decreased breath sounds. She has no wheezes. She has no rhonchi. She has no rales.   Abdominal: Soft. She exhibits no distension. Bowel sounds  are decreased. There is no tenderness.   Musculoskeletal: She exhibits no edema.   Neurological: She appears lethargic. GCS eye subscore is 2. GCS verbal subscore is 4. GCS motor subscore is 5.   Skin: Skin is dry. Capillary refill takes more than 3 seconds.       Vents:  Oxygen Concentration (%): 28 (10/28/17 0505)    Lines/Drains/Airways     Drain                 Hemodialysis AV Fistula 10/28/17 1245 Other less than 1 day         Urethral Catheter 10/28/17 0425 Non-latex 16 Fr. less than 1 day          Pressure Ulcer                 Pressure Ulcer 10/28/17 1305 Left other (see comments) Stage II less than 1 day          Peripheral Intravenous Line                 Peripheral IV - Single Lumen 10/27/17 2045 Right Other less than 1 day                Significant Labs:    CBC/Anemia Profile:    Recent Labs  Lab 10/27/17  2045 10/28/17  0652   WBC 13.12* 12.57   HGB 8.3* 8.3*   HCT 28.9* 28.9*   * 249   MCV 79* 77*   RDW 21.1* 20.8*        Chemistries:    Recent Labs  Lab 10/27/17  2045 10/28/17  0133 10/28/17  0652   * 135* 132*   K 6.7* 7.2* 6.7*   CL 96 99 97   CO2 18* 10* 16*   BUN 76* 79* 86*   CREATININE 3.6* 3.7* 3.9*   CALCIUM 8.7 8.4* 8.7   ALBUMIN 2.7*  --  2.6*   PROT 7.4  --  8.3   BILITOT 4.4*  --  3.4*   ALKPHOS 138*  --  129   *  --  154*   *  --  653*   MG  --   --  3.5*       All pertinent labs within the past 24 hours have been reviewed.  ABG    Recent Labs  Lab 10/28/17  0501   PH 7.297*   PO2 52*   PCO2 45.0   HCO3 22.0*   BE -4     Lab Results   Component Value Date    INR 1.2 06/06/2017    INR 1.1 03/16/2014    INR 1.1 03/04/2014       Significant Imaging:   I have reviewed all pertinent imaging results/findings within the past 24 hours.    Assessment/Plan:     Hyperkalemia    Patient has multiorgan failure with acute kidney injury, advanced metabolic acidosis, acute liver failure due to congestive heart failure as well as severe hyperkalemia which is not responding to  medical management.  Patient is critical.  I have talked to the patient wants to continue and proceed with urgent hemodialysis.  I have had a lengthy discussion with hospital medicine Ms. Bergman-SILVERIO, ICU staff with Meena Cullen-ANYA, Dr. Perla with vascular surgery as well as Dr. Duke with hepatology.  If the patient does not tolerate dialysis her prognosis extremely poor.  If patient does not tolerate regular dialysis she will not be a candidate for any further interventions.    Critical care time spent today is about 40 minutes total in multiple visits.  Greater than 50% of the time was spent in counseling with the patient and  discussing the therapeutic options with all specialties.              Thank you for your consult.     Tyler Fernandes MD  Nephrology  [facility]

## 2017-10-28 NOTE — SIGNIFICANT EVENT
Patient with advanced CHF, Pulm HTN ; critical valvular heart diease now with critical Hyperkalemia; liver failure ( possible CVC liver) and metabolic acidosis     Proceed with urgent HD     Patient seen and examined ; records reviewed ; full notes to follow           Tyler Fernandes MD

## 2017-10-28 NOTE — ASSESSMENT & PLAN NOTE
- Severe mitral stenosis with mean inflow gradient of 10 mmHg, severe leaflet and annular calcification with valve area of 0.8 sq cm; tricuspid valve structurally normal, but has moderate to severe tricuspid insufficiency; Severe pulmonary hypertension (PAP 56 mmHg with wedge of 35) per [facility]PHIRE 10/17 + RHC 10/20 at [facility].  - Patient evaluated by CVT surgery at [facility] on 10/20, and patient was not considered a candidate for surgical intervention.  It was felt patient was terminal with the current state of heart disease and palliative/hospice care was recommended, unfortunately patient declined.  - Hold statin, diuretics, and antihypertensives for now.  Will resume once/if able.  - Consider family conference to re-evaluate palliative/hospice care option. Daughter (Adriana Nieto) is surrogate decision maker.

## 2017-10-28 NOTE — CONSULTS
[facility] -   Gastroenterology  Consult Note    Patient Name: Becki Nieto  MRN: [id number]  Admission Date: 10/27/2017  Hospital Length of Stay: 1 days  Code Status: Full Code   Attending Provider: Noel Reyes MD   Consulting Provider: Priyanka Barnes MD  Primary Care Physician: Noel Winchester MD  Principal Problem:Acute renal failure superimposed on stage 3 chronic kidney disease    Inpatient consult to Gastroenterology  Consult performed by: PRIYANKA BARNES  Consult ordered by: JONATHON DUKE  Reason for consult: Liver failure  Assessment/Recommendations: Lactulose  Labs        Subjective:     HPI:  62F w/ complex medical issues including severe pulm HTN, mitral stenosis, CAD, polysubstance abuse recent admissions to [facility] for heart failure. There she was deemed a high risk surgical candidate for valve repair or CABG.  She is here with altered mentation, elevated ammonia, renal failure and worsening LFTs. Outside notes and records reviewed. AST and ALT were previously <100 now AST ~600 and ALT ~150.  Patient is encephalopathic unable to provide history.  She had HD catheter placed with plans for dialysis.    Past Medical History:   Diagnosis Date    Anxiety     Asthma     CHF (congestive heart failure)     Chronic pain     Back and Shoulder    COPD (chronic obstructive pulmonary disease)     Diabetes     Diabetes mellitus     Hep C w/o coma, chronic     Hepatitis C     High cholesterol     Hypertension        Past Surgical History:   Procedure Laterality Date    APPENDECTOMY      BACK SURGERY      CHOLECYSTECTOMY      ROTATOR CUFF REPAIR         Review of patient's allergies indicates:   Allergen Reactions    Corticosteroids (glucocorticoids) Shortness Of Breath    Corticosteroids (glucocorticoids) Shortness Of Breath     Per prior information from merged chart.     Family History     None        Social History Main Topics    Smoking status: Former Smoker     Packs/day: 1.00     Years:  40.00     Types: Cigarettes     Quit date: 2/16/2014    Smokeless tobacco: Not on file    Alcohol use Yes    Drug use: No    Sexual activity: Not on file     Review of Systems   Unable to perform ROS: Mental status change     Objective:     Vital Signs (Most Recent):  Temp: 97.4 °F (36.3 °C) (10/28/17 0812)  Pulse: 69 (10/28/17 0812)  Resp: 20 (10/28/17 0812)  BP: 116/65 (10/28/17 0812)  SpO2: 95 % (10/28/17 0812) Vital Signs (24h Range):  Temp:  [97.4 °F (36.3 °C)-98 °F (36.7 °C)] 97.4 °F (36.3 °C)  Pulse:  [67-80] 69  Resp:  [12-20] 20  SpO2:  [94 %-100 %] 95 %  BP: (104-136)/(49-75) 116/65     Weight: 95.9 kg (211 lb 6.7 oz) (10/28/17 0400)  Body mass index is 41.29 kg/m².      Intake/Output Summary (Last 24 hours) at 10/28/17 1315  Last data filed at 10/28/17 1004   Gross per 24 hour   Intake           751.67 ml   Output              175 ml   Net           576.67 ml       Lines/Drains/Airways     Drain                 Hemodialysis AV Fistula 10/28/17 1245 Other less than 1 day         Urethral Catheter 10/28/17 0425 Non-latex 16 Fr. less than 1 day          Peripheral Intravenous Line                 Peripheral IV - Single Lumen 10/27/17 2045 Right Other less than 1 day                Physical Exam   Constitutional: She appears well-developed and well-nourished. No distress.   HENT:   Head: Normocephalic and atraumatic.   Mouth/Throat: Oropharynx is clear and moist. No oropharyngeal exudate.   Eyes: Conjunctivae are normal. Pupils are equal, round, and reactive to light. Right eye exhibits no discharge. Left eye exhibits no discharge. Scleral icterus is present.   Pulmonary/Chest: Effort normal. No respiratory distress. She has no wheezes.   Coarse, distant BS bilaterally   Abdominal: Soft. She exhibits no distension. There is no tenderness.   Musculoskeletal: She exhibits edema (1+).   Neurological: She is alert.   Not oriented, +asterixis   Vitals reviewed.      Significant Labs:  CBC:   Recent Labs  Lab  10/27/17  2045 10/28/17  0652   WBC 13.12* 12.57   HGB 8.3* 8.3*   HCT 28.9* 28.9*   * 249     CMP:   Recent Labs  Lab 10/28/17  0652   *   CALCIUM 8.7   ALBUMIN 2.6*   PROT 8.3   *   K 6.7*   CO2 16*   CL 97   BUN 86*   CREATININE 3.9*   ALKPHOS 129   *   *   BILITOT 3.4*     Coagulation: No results for input(s): INR, APTT in the last 48 hours.    Invalid input(s): PT    Significant Imaging:  CT: I have reviewed all results within the past 24 hours and my personal findings are:  no acute findings on head CT    Assessment/Plan:     Subacute liver failure without hepatic coma    Suspect worsening liver labs and functioning 2/2 heart failure as the AST elevation compared to ALT is most c/w ischemia. Patient's overall prognosis is very poor given her severe heart issues that are not amenable to intervention in addition to her multiorgan failure.  -Check INR daily  -Check lactic acid and BNP  -Check CMP and CBC daily  -Check acetaminophen level  -Cards or pulm input for heart failure        Encephalopathy, metabolic    Suspect related to both hepatic encephalopathy and renal related metabolic issues  -Agree with lactulose titrate to 3-4 BMs/day  -Check for infection            Thank you for your consult. I will follow-up with patient. Please contact us if you have any additional questions.    Priyanka Duke MD  Gastroenterology  [facility]

## 2017-10-28 NOTE — ASSESSMENT & PLAN NOTE
- , , T bili 4.4 on admission.  Previous labs at [facility] on 10/9/17 with AST 71, ALT 23, T bili 0.8.  - Hold home statin, and analgesics with acetaminophen.  - Check CPK to r/o Rhabdo.  - IV hydration as above.  - ? Hepatorenal syndrome?

## 2017-10-28 NOTE — ASSESSMENT & PLAN NOTE
- Clinically appears on dry side (weight down 3kg from previously documented dry weight).  - Continuous IVF's as above, cautious not to overload.  - Home diuretics on hold for now.  - Strict I&O's, daily weights.

## 2017-10-28 NOTE — ASSESSMENT & PLAN NOTE
Suspect related to both hepatic encephalopathy and renal related metabolic issues  -Agree with lactulose titrate to 3-4 BMs/day  -Check for infection

## 2017-10-28 NOTE — HOSPITAL COURSE
10/27 - admitted to telemetry after given calcium gluconate, iv insulin and D50, and kayexalate given in ED  10/28 - transferred to ICU after placement of vas cath for HD for filtration of potassium, lactate

## 2017-10-28 NOTE — H&P
[facility] Hospital Medicine  History & Physical    Patient Name: Becki Nieto  MRN: [id number]  Admission Date: 10/27/2017  Attending Physician: Stone Friedman MD   Primary Care Provider: Noel Winchester MD         Patient information was obtained from nursing home, past medical records and ER records.     Subjective:     Principal Problem:Acute renal failure superimposed on stage 3 chronic kidney disease    Chief Complaint:   Chief Complaint   Patient presents with    Abnormal Lab     Potassium 7.1.  D/C from [facility] yesterday        HPI: Unable to obtain history from patient due to AMS.  History obtained from ED staff, NH records, and medical records reviewed in Epic.  Ms. Nieto is a 61yo AA female with an extensive PMHx of multivessel CAD, severe mitral valve stenosis, severe tricuspid insufficiency, severe secondary pulmonary hypertension, HTN, chronic diastolic HFpEF of 60%, HLD, COPD with chronic respiratory failure on home O2, Hep C, noncompliance, polysubstance abuse with h/o IV drug use, CKD stage III, and DM II, who was transferred from NH to ED due to elevated potassium (7.1) found on routine lab work today.  Patient was on Bumex 2mg daily with no potassium supplementation at NH.  Initial work-up in ED resulted K 6.7, BUN 76, cr. 3.6, T bili 4.4, , , H&H 8.3/29.  She was just DC from [facility] yesterday with K 4.8, cr, 1.59.  Patient received calcium gluconate IV, insulin IV with D50, and Kayexalate in ED.  No arrhythmias on telemetry monitor, EKG stable.  Hospital Medicine was consulted for admission.  Currently, patient appears comfortable, in NAD.  She opens eye to verbal stimuli, but only mumbles incomprehensible words.  It is important to note that patient underwent RHC + LHC at [facility] on 10/20/17.  Patient has numerous prior admissions at [facility] for decompensated HF, CAD, and valvular heart disease (13 admissions in the past 1 year).  Last hospitalized at [facility] 10/9/2017-10/26/2017 for  noncompliance/drug overdose.  During that admission, she was evaluated by Cardiology and CVT surgery, and deemed to be poor surgical candidate.  Hospice was recommended, but patient declined.  Subsequently, she was DC to NH facility.      Past Medical History:   Diagnosis Date    Anxiety     Asthma     CHF (congestive heart failure)     Chronic pain     Back and Shoulder    COPD (chronic obstructive pulmonary disease)     Diabetes     Diabetes mellitus     Hep C w/o coma, chronic     Hepatitis C     High cholesterol     Hypertension        Past Surgical History:   Procedure Laterality Date    APPENDECTOMY      BACK SURGERY      CHOLECYSTECTOMY      ROTATOR CUFF REPAIR         Review of patient's allergies indicates:   Allergen Reactions    Corticosteroids (glucocorticoids) Shortness Of Breath    Corticosteroids (glucocorticoids) Shortness Of Breath     Per prior information from merged chart.       No current facility-administered medications on file prior to encounter.      Current Outpatient Prescriptions on File Prior to Encounter   Medication Sig    aspirin (ECOTRIN) 81 MG EC tablet Take 81 mg by mouth once daily.    atorvastatin (LIPITOR) 40 MG tablet Take 40 mg by mouth once daily.    ferrous sulfate 325 (65 FE) MG EC tablet Take 1 tablet (325 mg total) by mouth 2 (two) times daily.    insulin glargine (LANTUS) 100 unit/mL injection Inject 10 Units into the skin once daily.     metoprolol tartrate (LOPRESSOR) 25 MG tablet Take 1 tablet (25 mg total) by mouth 2 (two) times daily.    albuterol (ACCUNEB) 0.63 mg/3 mL Nebu Take 0.63 mg by nebulization every 6 (six) hours as needed. Rescue    albuterol (PROAIR HFA) 90 mcg/actuation inhaler Inhale 2 puffs into the lungs every 6 (six) hours as needed.    albuterol sulfate 90 mcg/actuation AePB Inhale 180 mcg into the lungs every 4 (four) hours. Rescue    albuterol-ipratropium 2.5mg-0.5mg/3mL (DUO-NEB) 0.5 mg-3 mg(2.5 mg base)/3 mL nebulizer  solution Take 3 mLs by nebulization every 6 (six) hours as needed for Wheezing. Rescue    alprazolam (XANAX) 1 MG tablet Take 2 tablets (2 mg total) by mouth 2 (two) times daily.    amlodipine (NORVASC) 10 MG tablet Take 1 tablet (10 mg total) by mouth once daily.    amlodipine (NORVASC) 10 MG tablet Take 10 mg by mouth once daily.    blood sugar diagnostic Strp by Misc.(Non-Drug; Combo Route) route.    budesonide-formoterol 160-4.5 mcg (SYMBICORT) 160-4.5 mcg/actuation HFAA Inhale 2 puffs into the lungs every 12 (twelve) hours.    budesonide-formoterol 160-4.5 mcg (SYMBICORT) 160-4.5 mcg/actuation HFAA Inhale 2 puffs into the lungs every 12 (twelve) hours. Controller    carvedilol (COREG) 6.25 MG tablet Take 6.25 mg by mouth 2 (two) times daily with meals.    clonazePAM (KLONOPIN) 1 MG tablet Take 1 mg by mouth 2 (two) times daily as needed for Anxiety.    ferrous sulfate 324 mg (65 mg iron) TbEC Take 325 mg by mouth once daily.    furosemide (LASIX) 20 MG tablet Take 1 tablet (20 mg total) by mouth 2 (two) times daily.    furosemide (LASIX) 40 MG tablet Take 40 mg by mouth 2 (two) times daily.    hydrALAZINE (APRESOLINE) 50 MG tablet Take 50 mg by mouth 3 (three) times daily.    hydrocodone-acetaminophen 10-325mg (NORCO)  mg Tab Take by mouth.    hydrocodone-acetaminophen 5-325mg (NORCO) 5-325 mg per tablet Take 1 tablet by mouth every 6 (six) hours as needed.    insulin aspart (NOVOLOG) 100 unit/mL InPn pen Inject 12 Units into the skin 3 (three) times daily.    lisinopril 10 MG tablet Take 1 tablet (10 mg total) by mouth once daily.    predniSONE (DELTASONE) 10 MG tablet Take 10 mg by mouth once daily.    promethazine-dextromethorphan (PROMETHAZINE-DM) 6.25-15 mg/5 mL Syrp Take by mouth.    zolpidem (AMBIEN) 5 MG Tab Take 5 mg by mouth nightly as needed.     Family History     Reviewed and Not Pertinent        Social History Main Topics    Smoking status: Former Smoker     Packs/day:  1.00     Years: 40.00     Types: Cigarettes     Quit date: 2/16/2014    Smokeless tobacco: Not on file    Alcohol use Yes    Drug use: No    Sexual activity: Not on file     Review of Systems   Unable to perform ROS: Mental status change     Objective:     Vital Signs (Most Recent):  Temp: 98 °F (36.7 °C) (10/27/17 2344)  Pulse: 72 (10/27/17 2344)  Resp: 15 (10/27/17 2344)  BP: (!) 129/53 (10/27/17 2344)  SpO2: 100 % (10/27/17 2344) Vital Signs (24h Range):  Temp:  [97.5 °F (36.4 °C)-98 °F (36.7 °C)] 98 °F (36.7 °C)  Pulse:  [67-80] 72  Resp:  [12-20] 15  SpO2:  [97 %-100 %] 100 %  BP: (104-136)/(50-75) 129/53        There is no height or weight on file to calculate BMI.    Physical Exam   Constitutional: She appears well-developed and well-nourished. She appears lethargic.  Non-toxic appearance. No distress.   HENT:   Head: Normocephalic and atraumatic.   Eyes: Conjunctivae and EOM are normal. Pupils are equal, round, and reactive to light.   Neck: Neck supple. No JVD present. No thyromegaly present.   Cardiovascular: Normal rate and intact distal pulses.  An irregular rhythm present.  No extrasystoles are present. Exam reveals no gallop and no friction rub.    Murmur (soft pansystolic, mid diastolic) heard.  Pulmonary/Chest: Effort normal and breath sounds normal. No respiratory distress. She has no wheezes. She has no rales.   Abdominal: Soft. Bowel sounds are normal. She exhibits ascites. She exhibits no fluid wave and no abdominal bruit. There is no tenderness.   Musculoskeletal: Normal range of motion. She exhibits edema (trace to BLE). She exhibits no tenderness or deformity.   Neurological: She appears lethargic. She is disoriented. GCS eye subscore is 3. GCS verbal subscore is 2. GCS motor subscore is 5.   Skin: Skin is warm and dry. No rash noted. No erythema.   Nursing note and vitals reviewed.       Significant Labs:   Recent Lab Results       10/27/17  2045 10/27/17  2025      Albumin 2.7(L)       Alkaline Phosphatase 138(H)      (H)      Amorphous, UA  Occasional     Anion Gap 21(H)      Appearance, UA  Clear     (H)      Bacteria, UA  Occasional     Baso # 0.01      Basophil% 0.1      Bilirubin (UA)  1+  Comment:  Positive urine bilirubin is not confirmed. Correlate with   serum bilirubin and clinical presentation.  (A)     Total Bilirubin 4.4  Comment:  For infants and newborns, interpretation of results should be based  on gestational age, weight and in agreement with clinical  observations.  Premature Infant recommended reference ranges:  Up to 24 hours.............<8.0 mg/dL  Up to 48 hours............<12.0 mg/dL  3-5 days..................<15.0 mg/dL  6-29 days.................<15.0 mg/dL  (H)      BUN, Bld 76(H)      Calcium 8.7      Chloride 96      CO2 18(L)      Color, UA  Yellow     Creatinine 3.6(H)      Differential Method Automated      eGFR if  15(A)      eGFR if non  13  Comment:  Calculation used to obtain the estimated glomerular filtration  rate (eGFR) is the CKD-EPI equation. Since race is unknown   in our information system, the eGFR values for   -American and Non--American patients are given   for each creatinine result.  (A)      Eos # 0.0      Eosinophil% 0.0      Glucose 66(L)      Glucose, UA  Negative     Gran # 10.1(H)      Gran% 77.0(H)      Hematocrit 28.9(L)      Hemoglobin 8.3(L)      Hyaline Casts, UA  0     Ketones, UA  Negative     Leukocytes, UA  1+(A)     Lymph # 1.7      Lymph% 12.9(L)      MCH 22.6(L)      MCHC 28.7(L)      MCV 79(L)      Microscopic Comment  SEE COMMENT  Comment:  Other formed elements not mentioned in the report are not   present in the microscopic examination.        Mono # 1.3(H)      Mono% 10.2      MPV 10.1      Nitrite, UA  Negative     Occult Blood UA  Trace(A)     pH, UA  5.0     Platelets 360(H)      Potassium 6.7  Comment:  critical result(s) called and verbal readback obtained from    K 6.7 JUANITA SMART RN , 10/27/2017 21:28  (HH)      Total Protein 7.4      Protein, UA  2+  Comment:  Recommend a 24 hour urine protein or a urine   protein/creatinine ratio if globulin induced proteinuria is  clinically suspected.  (A)     RBC 3.68(L)      RBC, UA  0     RDW 21.1(H)      Sodium 135(L)      Specific Gravity, UA  1.020     Specimen UA  Urine, Catheterized     Squam Epithel, UA  3     Urobilinogen, UA  2.0-3.0(A)     WBC, UA  4     WBC 13.12(H)      Yeast, UA  Occasional(A)         All pertinent labs within the past 24 hours have been reviewed.    Significant Imaging:   Imaging Results    None        I have reviewed all pertinent imaging results/findings within the past 24 hours.        Assessment/Plan:     * Acute renal failure on CKD stage 3 with hyperkalemia    - Patient underwent LHC + RHC at [facility] 10/20/17; ? KYLER.  - Patient was on Bumex 2mg daily with no potassium supplementation at NH.    - Creatinine 3.6 (baseline 1.7), K 6.7 on admission.  Labs from [facility] 10/24 with cr. 1.59, K 4.8.  - Calcium gluconate 1gm IV, insulin 10 units IV + D50, and Kayexalate 30gm given in ED.  - EKG without peaked T waves, P wave present with increased CA interval. No arrhythmias on telemetry.   - Will admit to Telemetry.  - Continuous IVF's with 0.9%NS @ 100mL/hr.  - BMP Q4 hours for now.  - Will insert rincon to ensure no retention.  - Check CPK to r/o Rhabdo.  - Renal U/S.  - Nephrology consulted.  If no improvement in K, likely will need HD.  - Avoid nephrotoxic agents.        Metabolic acidosis    - Plan as above.        Encephalopathy, metabolic    - Likely secondary to above.  - CT head pending.  - Check ABG, UDS.  - Neuro checks.        Severe mitral valve stenosis    - Severe mitral stenosis with mean inflow gradient of 10 mmHg, severe leaflet and annular calcification with valve area of 0.8 sq cm; tricuspid valve structurally normal, but has moderate to severe tricuspid insufficiency; Severe pulmonary  hypertension (PAP 56 mmHg with wedge of 35) per [facility] 10/17 + RHC 10/20 at [facility].  - Patient evaluated by CVT surgery at [facility] on 10/20, and patient was not considered a candidate for surgical intervention.  It was felt patient was terminal with the current state of heart disease and palliative/hospice care was recommended, unfortunately patient declined.  - Hold statin, diuretics, and antihypertensives for now.  Will resume once/if able.  - Consider family conference to re-evaluate palliative/hospice care option. Daughter (Adriana Nieto) is surrogate decision maker.        Chronic hepatitis C without hepatic coma    - , , T bili 4.4 on admission.  Previous labs at [facility] on 10/9/17 with AST 71, ALT 23, T bili 0.8.  - Hold home statin, and analgesics with acetaminophen.  - Check CPK to r/o Rhabdo.  - IV hydration as above.  - ? Hepatorenal syndrome?        Coronary artery disease involving native coronary artery    - 70% stenosis of LCx to OM and high-grade  of RCA per C on 10/20 at [facility].  - Patient evaluated by CVT surgery, and not deemed a candidate for surgical revascularization.  Medical management and palliative/hospice care recommended.  - Beta blocker and statin on hold due to above.  Resume once safe.        Chronic diastolic heart failure due to valvular disease    - Clinically appears on dry side (weight down 3kg from previously documented dry weight).  - Continuous IVF's as above, cautious not to overload.  - Home diuretics on hold for now.  - Strict I&O's, daily weights.        Essential hypertension    - BP well controlled without antihypertensives.  - Monitor BP trends, and resume appropriate BP meds as needed.        Type 2 diabetes mellitus with kidney complication, with long-term current use of insulin    - Glucose 66 on admission.  - Will hold home Lantus and SSI due to #1.   - AccuChecks Q6 hours.  - HbA1c 6.1 in 9/2017.          VTE Risk Mitigation         Ordered     enoxaparin  injection 30 mg  Daily     Route:  Subcutaneous        10/28/17 0055     Medium Risk of VTE  Once      10/28/17 0026     Place sequential compression device  Until discontinued      10/28/17 0026             SILVERIO Lin  Department of Hospital Medicine   [facility]

## 2017-10-28 NOTE — ASSESSMENT & PLAN NOTE
- 70% stenosis of LCx to OM and high-grade  of RCA per [facility] on 10/20 at [facility].  - Patient evaluated by CVT surgery, and not deemed a candidate for surgical revascularization.  Medical management and palliative/hospice care recommended.  - Beta blocker and statin on hold due to above.  Resume once safe.

## 2017-10-28 NOTE — SUBJECTIVE & OBJECTIVE
Past Medical History:   Diagnosis Date    Anxiety     Asthma     CHF (congestive heart failure)     Chronic pain     Back and Shoulder    COPD (chronic obstructive pulmonary disease)     Diabetes     Diabetes mellitus     Hep C w/o coma, chronic     Hepatitis C     High cholesterol     Hypertension        Past Surgical History:   Procedure Laterality Date    APPENDECTOMY      BACK SURGERY      CHOLECYSTECTOMY      ROTATOR CUFF REPAIR         Review of patient's allergies indicates:   Allergen Reactions    Corticosteroids (glucocorticoids) Shortness Of Breath    Corticosteroids (glucocorticoids) Shortness Of Breath     Per prior information from merged chart.       Family History     None        Social History Main Topics    Smoking status: Former Smoker     Packs/day: 1.00     Years: 40.00     Types: Cigarettes     Quit date: 2/16/2014    Smokeless tobacco: Not on file    Alcohol use Yes    Drug use: No    Sexual activity: Not on file         Review of Systems   Reason unable to perform ROS: limited s/t lethargy.   Respiratory: Negative for shortness of breath.    Cardiovascular: Negative for chest pain.   Gastrointestinal: Negative for nausea and vomiting.     Objective:     Vital Signs (Most Recent):  Temp: 98.1 °F (36.7 °C) (10/28/17 1445)  Pulse: 76 (10/28/17 1530)  Resp: 12 (10/28/17 1530)  BP: (!) 130/52 (10/28/17 1530)  SpO2: (!) 94 % (10/28/17 1530) Vital Signs (24h Range):  Temp:  [97.4 °F (36.3 °C)-98.1 °F (36.7 °C)] 98.1 °F (36.7 °C)  Pulse:  [67-80] 76  Resp:  [10-20] 12  SpO2:  [92 %-100 %] 94 %  BP: ()/(35-75) 130/52     Weight: 94.9 kg (209 lb 3.5 oz)  Body mass index is 40.86 kg/m².      Intake/Output Summary (Last 24 hours) at 10/28/17 1559  Last data filed at 10/28/17 1500   Gross per 24 hour   Intake           751.67 ml   Output              195 ml   Net           556.67 ml       Physical Exam   Constitutional: She appears well-nourished. She appears lethargic. She has  a sickly appearance. She is not intubated. Nasal cannula in place.   HENT:   Head: Normocephalic.   Eyes: Conjunctivae are normal. Pupils are equal, round, and reactive to light.   Neck: No JVD present. No tracheal deviation present.   Cardiovascular: Normal rate and regular rhythm.   No extrasystoles are present.   Murmur heard.   Systolic murmur is present   Pulses:       Radial pulses are 2+ on the right side, and 2+ on the left side.        Dorsalis pedis pulses are 1+ on the right side, and 1+ on the left side.   Pulmonary/Chest: Effort normal. No accessory muscle usage. She is not intubated. She has decreased breath sounds. She has no wheezes. She has no rhonchi. She has no rales.   Abdominal: Soft. She exhibits no distension. Bowel sounds are decreased. There is no tenderness.   Musculoskeletal: She exhibits no edema.   Neurological: She appears lethargic. GCS eye subscore is 2. GCS verbal subscore is 4. GCS motor subscore is 5.   Skin: Skin is dry. Capillary refill takes more than 3 seconds.       Vents:  Oxygen Concentration (%): 28 (10/28/17 0505)    Lines/Drains/Airways     Drain                 Hemodialysis AV Fistula 10/28/17 1245 Other less than 1 day         Urethral Catheter 10/28/17 0425 Non-latex 16 Fr. less than 1 day          Pressure Ulcer                 Pressure Ulcer 10/28/17 1305 Left other (see comments) Stage II less than 1 day          Peripheral Intravenous Line                 Peripheral IV - Single Lumen 10/27/17 2045 Right Other less than 1 day                Significant Labs:    CBC/Anemia Profile:    Recent Labs  Lab 10/27/17  2045 10/28/17  0652   WBC 13.12* 12.57   HGB 8.3* 8.3*   HCT 28.9* 28.9*   * 249   MCV 79* 77*   RDW 21.1* 20.8*        Chemistries:    Recent Labs  Lab 10/27/17  2045 10/28/17  0133 10/28/17  0652   * 135* 132*   K 6.7* 7.2* 6.7*   CL 96 99 97   CO2 18* 10* 16*   BUN 76* 79* 86*   CREATININE 3.6* 3.7* 3.9*   CALCIUM 8.7 8.4* 8.7   ALBUMIN 2.7*   --  2.6*   PROT 7.4  --  8.3   BILITOT 4.4*  --  3.4*   ALKPHOS 138*  --  129   *  --  154*   *  --  653*   MG  --   --  3.5*       All pertinent labs within the past 24 hours have been reviewed.  [facility]    Recent Labs  Lab 10/28/17  0501   PH 7.297*   PO2 52*   PCO2 45.0   HCO3 22.0*   BE -4     Lab Results   Component Value Date    INR 1.2 06/06/2017    INR 1.1 03/16/2014    INR 1.1 03/04/2014       Significant Imaging:   I have reviewed all pertinent imaging results/findings within the past 24 hours.

## 2017-10-28 NOTE — ASSESSMENT & PLAN NOTE
- Patient underwent LHC + RHC at [facility] 10/20/17; ? KYLER.  - Patient was on Bumex 2mg daily with no potassium supplementation at NH.    - Creatinine 3.6 (baseline 1.7), K 6.7 on admission.  Labs from [facility] 10/24 with cr. 1.59, K 4.8.  - Calcium gluconate 1gm IV, insulin 10 units IV + D50, and Kayexalate 30gm given in ED.  - EKG without peaked T waves, P wave present with increased [place] interval. No arrhythmias on telemetry.   - Will admit to Telemetry.  - Continuous IVF's with 0.9%NS @ 100mL/hr.  - BMP Q4 hours for now.  - Will insert rincon to ensure no retention.  - Check CPK to r/o Rhabdo.  - Renal U/S.  - Nephrology consulted.  If no improvement in K, likely will need CRRT.  - Avoid nephrotoxic agents.  -Creatinine 3.6>>3.9  -Vascular Surgery consulted for Vas Cath placement  -HD initiated per Nephrology   Normal vision: sees adequately in most situations; can see medication labels, newsprint

## 2017-10-28 NOTE — HPI
62 year old female with PMH of CKD, Hep C, DM2, HTN, HLD, COPD on home oxygen, and chronic diastolic heart failure with preserved EF, secondary pulmonary HTN, severe MV stenosis, and severe tricuspid insufficiency along with IV drug abuse; she was admitted at [facility] from 10/9/17-10/26/17 for IV drug overdose during that hospitalization she underwent evaluation by cardiology and CVT surgery re:heart failure, valvular disease, she was denied surgical intervention s/t ongoing IV drug abuse and hospice care was recommended, she declined hospice care and was discharged to NH care  10/27 she presented to ED from NH s/t elevated potassium on NH labs  In ED pt is obtunded with incomprehensible speech and evaluation revealed K 6.7, creatinine 3.6, elevated liver enzymes

## 2017-10-28 NOTE — ASSESSMENT & PLAN NOTE
- Glucose 66 on admission.  - Will hold home Lantus and SSI due to #1.   - AccuChecks Q6 hours.  - HbA1c 6.1 in 9/2017.

## 2017-10-28 NOTE — HOSPITAL COURSE
Pt admitted to Telemetry Unit for acute on chronic renal failure with hyperkalemia with Nephrology consulted.  Liver enzymes significantly elevated from baseline with elevated ammonia level noted and GI consulted.  Lactulose initiated.  Pt transferred to ICU for critical care management.  Vascular Surgery consulted for Vas Cath placement and initiation of HD.  Hyperkalemia improved with Kayexalate.      10/29:  Patient had vas cath placed yesterday and received course of dialysis for clearance and minimal fluid removal;  Renal function continues to deteriorate and now with anuria;  After discussion with nephrology consultant; no further dialysis, and family has made her a DNR; family desirous of proceeding with in patient Hospice;  Case management has been consulted.  Likely cardio-renal/ hepato- renal deterioration.  Prognosis grave.  Family conference ensued with hospice representative from [facility];  Patient with multiorgan  system failure with worsening liver function; secondary to heart failure and ischemia;  She was also coagulopathic with worsening mental status change and obtundation.  Renal withdrew dialysis and family agreed to comfort measures.  A bed was secured for her at Hospice with plans for transfer today.

## 2017-10-28 NOTE — OP NOTE
[facility] -   Vascular Surgery  Operative Note    SUMMARY     Date of Procedure: 10/28/2017     Procedure: Procedure(s) (LRB):  VASCULAR CATH INSERTION (N/A)   Left IJ vas catheter    Surgeon(s) and Role:     * Remington Perla MD - Primary    Assisting Surgeon: None    Pre-Operative Diagnosis: * No pre-op diagnosis entered *    Post-Operative Diagnosis: Post-Op Diagnosis Codes:     * Chronic kidney disease, unspecified CKD stage [N18.9]    Anesthesia: RN IV Sedation    Technical Procedures Used: seldinger    Description of the Findings of the Procedure: emergent consent.  Left neck prep sterile.  US used with lidocaine 1%. Successful cannulation.  Serial dialation with catheter tip placed in SVC.  Sutured with prolene.  Heparin flush and aspirated.  Locked with 2000 units heparin each port.     Significant Surgical Tasks Conducted by the Assistant(s), if Applicable: none    Complications: No    Estimated Blood Loss (EBL): * No values recorded between 10/28/2017 12:00 AM and 10/28/2017 11:17 AM *           Implants: * No implants in log *    Specimens:   Specimen (12h ago through future)    None                  Condition: Serious    Disposition: PACU - guarded condition.    Attestation: I performed the procedure.

## 2017-10-28 NOTE — INTERVAL H&P NOTE
The patient has been examined and the H&P has been reviewed:    I concur with the findings and no changes have occurred since H&P was written.    Anesthesia/Surgery risks, benefits and alternative options discussed and understood by patient/family.          Active Hospital Problems    Diagnosis  POA    *Acute renal failure on CKD stage 3 with hyperkalemia [N17.9, N18.3]  Yes     Last Assessment & Plan:   Baseline creatinine approx 1.7,  chronic kidney disease stage III, currently appears at baseline.      Encephalopathy, metabolic [G93.41]  Yes    Chronic hepatitis C without hepatic coma [B18.2]  Yes     Chronic    Metabolic acidosis [E87.2]  Yes    Severe mitral valve stenosis [I05.0]  Yes     Chronic    Hyperkalemia [E87.5]  Yes    Coronary artery disease involving native coronary artery [I25.10]  Yes     Chronic     Last Assessment & Plan:   As above, status post right & left heart catheterization on 10/20/17.  She appears to have significant right coronary artery occlusion with collateral flow along with obtuse marginal disease.  She does not appear to be a appropriate surgical bypass candidate.  Continue appropriate conservative medical management with ASA/BB/statin.  No evidence of acute cardiac ischemia during this admission.      Chronic diastolic heart failure due to valvular disease [I50.32, I38]  Yes     Chronic     Last Assessment & Plan:   Patient is a 62-year-old female with past medical history of multiple medical problems including chronic respiratory failure on 4 L nasal cannula with BiPAP at night, COPD, chronic kidney disease stage III, severe pulmonary hypertension with right-sided heart failure and diabetes mellitus type 2 who presents to [facility] emergency room from home after being found unresponsive by her daughter.  Patient was discharged 2 days prior to this admission and she was treated the daytime for a mild heart failure exacerbation.  She reports in the last 2 days  she has been taking her medications and she has been compliant with her medication regimen at home.  She denied any worsening shortness of breath on admission.  Patient reported that she took 2 Lortabs for her left rotator cuff injury and drinks several wine coolers.  She was found unresponsive on her porch and her daughter called EMS.  She was given 2 mg of Narcan and her mental state improved to baseline.  She was found to be positive for cocaine.  Patient was admitted to the hospital for accidental drug overdose due to cocaine with 2 Lortabs combined with alcohol use.  Was evaluated by cardiology during this hospital stay, echocardiogram on September 17, 2017 showed that the patient has normal left ventricular size ejection fraction of 60% with a very happy calcification of the mitral valve.  Patient has a baseline creatinine in October 6, 2017 of 1.4, she was found to have acute renal failure with a creatinine that it peaked at 3.16.  She was found to be anemic during this hospital stay with hematocrit of 26 and hemoglobin of 7.9.  Chest x-ray on admission showed that the patient had mild cardiomegaly with diffuse increased interstitial markings consistent with CHF.  Patient was placed on IV diuresis, shortness of breath and orthopnea improved and currently discharged in stable patient was found to have a chronic diastolic heart failure with severe pulmonary hypertension, severe mitral stenosis and right ventricular fever.  Transthoracic echocardiogram showed that the patient has pulmonary artery pressure of 63 mmHg.  Right and left heart cath confirmed severely calcified mitral valve in the setting of pulmonary hypertension and coronary artery disease.  Patient was evaluated by CVG she does not appear to be an optimal surgical candidate.  Right and left heart cath was done in October 20, 2017.  Patient appeared to have significant right coronary artery occlusion with collateral flow along with obtuse marginal  disease.  Patient is not a candidate for CABG.  She needs to continue on aspirin, beta-blocker and statin.  Patient was transfused 2 units of packed red blood cells on October 15, 2017 in anticipation of right and left heart cath currently discharge H&H is stable.  At 9.3 and 30.5.  BUN and creatinine discharge is 58 and 1.6.  Accu-Cheks they have been stable below 180.  He has anemia of chronic disease and she needs to continue on iron and folic acid supplements at discharge due to iron deficiency and folic acid deficiency diagnosed during this hospital stay.  Patient has obstructive sleep apnea she needs to continue on BiPAP at night with settings of 13/8.  Patient is to continue on oxygen during the daytime 3 L nasal cannula to keep oxygen sats above 91%.  Patient is obese with a BMI of 35.9 she was counseled on a weight loss program.  Her current hemoglobin A1c 6.1 and her diabetes is well controlled on her current diabetic regimen.  The patient was seen by palliative care team during this hospital stay and she wants to be full code.  She has a poor social situation.  This is the patient 13th admission since the beginning of the year.  She is going to be discharged to a nursing home this is [facility].  Currently her condition is stable.      Essential hypertension [I10]  Yes     Chronic     Last Assessment & Plan:   Continue home regimen      Type 2 diabetes mellitus with kidney complication, with long-term current use of insulin [E11.29, Z79.4]  Not Applicable     Chronic     Last Assessment & Plan:   She had a recent admission due to hypoglycemia whereby EMS was notified.  Current A1c is 6.1.         Resolved Hospital Problems    Diagnosis Date Resolved POA   No resolved problems to display.

## 2017-10-29 PROBLEM — E87.20 METABOLIC ACIDOSIS: Status: RESOLVED | Noted: 2017-01-01 | Resolved: 2017-01-01

## 2017-10-29 PROBLEM — D68.9 COAGULOPATHY: Status: ACTIVE | Noted: 2017-01-01

## 2017-10-29 PROBLEM — E87.5 HYPERKALEMIA: Status: RESOLVED | Noted: 2017-01-01 | Resolved: 2017-01-01

## 2017-10-29 NOTE — ASSESSMENT & PLAN NOTE
- BP well controlled without antihypertensives.  - Monitor BP trends, and resume appropriate BP meds as needed.    10/29:  Maintaining BP at present.  Continue present regimen and monitoring.

## 2017-10-29 NOTE — ASSESSMENT & PLAN NOTE
- Glucose 66 on admission.  -stable  - Will hold home Lantus and SSI due to #1.   - AccuChecks Q6 hours.  - HbA1c 6.1 in 9/2017.

## 2017-10-29 NOTE — PROGRESS NOTES
Guest [facility] notified that patient .  Patient had been at facility 1 day prior to admit.  BARRETT spoke with nurse [place] to advise.  Demetra Valderrama LMSW, MADI-BARRETT, [facility]  10/29/2017

## 2017-10-29 NOTE — PROGRESS NOTES
SW contacted [facility] to advise of referral.  Awaitng call back from on-call nurse.  Demetra Valderrama LMSw, CCM, [facility]BARRETT  10/29/2017

## 2017-10-29 NOTE — ASSESSMENT & PLAN NOTE
-GI consulted   -lactulose initiated   -Tylenol level WNL  - PT/INR results pending     10/29:  Question hepato renal/ cardiorenal process; Continues slide in hepatic function and coagulopathic

## 2017-10-29 NOTE — PROGRESS NOTES
[facility] Hospital Medicine  Progress Note    Patient Name: Becki Nieto  MRN: [id number]  Patient Class: IP- Inpatient   Admission Date: 10/27/2017  Length of Stay: 1 days  Attending Physician: Noel Reyes MD  Primary Care Provider: Noel Winchester MD        Subjective:     Principal Problem:Acute renal failure superimposed on stage 3 chronic kidney disease    HPI:  Unable to obtain history from patient due to AMS.  History obtained from ED staff, NH records, and medical records reviewed in Epic.  Ms. Nieto is a 63yo AA female with an extensive PMHx of multivessel CAD, severe mitral valve stenosis, severe tricuspid insufficiency, severe secondary pulmonary hypertension, HTN, chronic diastolic HFpEF of 60%, HLD, COPD with chronic respiratory failure on home O2, Hep C, noncompliance, polysubstance abuse with h/o IV drug use, CKD stage III, and DM II, who was transferred from NH to ED due to elevated potassium (7.1) found on routine lab work today.  Patient was on Bumex 2mg daily with no potassium supplementation at NH.  Initial work-up in ED resulted K 6.7, BUN 76, cr. 3.6, T bili 4.4, , , H&H 8.3/29.  She was just DC from [facility] yesterday with K 4.8, cr, 1.59.  Patient received calcium gluconate IV, insulin IV with D50, and Kayexalate in ED.  No arrhythmias on telemetry monitor, EKG stable.  Hospital Medicine was consulted for admission.  Currently, patient appears comfortable, in NAD.  She opens eye to verbal stimuli, but only mumbles incomprehensible words.  It is important to note that patient underwent RHC + LHC at [facility] on 10/20/17.  Patient has numerous prior admissions at [facility] for decompensated HF, CAD, and valvular heart disease (13 admissions in the past 1 year).  Last hospitalized at [facility] 10/9/2017-10/26/2017 for noncompliance/drug overdose.  During that admission, she was evaluated by Cardiology and CVT surgery, and deemed to be poor surgical candidate.  Hospice was recommended,  but patient declined.  Subsequently, she was DC to NH facility.      Hospital Course:  Pt admitted to Telemetry Unit for acute on chronic renal failure with hyperkalemia with Nephrology consulted.  Liver enzymes significantly elevated from baseline with elevated ammonia level noted and GI consulted.  Lactulose initiated.  Pt transferred to ICU for critical care management.  Vascular Surgery consulted for Vas Cath placement and initiation of HD.  Hyperkalemia improved with Kayexalate.      Interval History: pt noted to have decreased orientation with elevated ammonia and liver enzymes noted.  Lactulose initiated with GI consulted.  Creatinine 3.6>>3.9 with Nephrology following.  Vascular Surgery consulted for Vas Cath placement with HD initiated.  Pt transferred to ICU for critical care management.      Review of Systems   Unable to perform ROS: Mental status change     Objective:     Vital Signs (Most Recent):  Temp: 98 °F (36.7 °C) (10/28/17 1600)  Pulse: 90 (10/28/17 1830)  Resp: 14 (10/28/17 1830)  BP: (!) 150/55 (10/28/17 1830)  SpO2: 97 % (10/28/17 1830) Vital Signs (24h Range):  Temp:  [97.4 °F (36.3 °C)-98.1 °F (36.7 °C)] 98 °F (36.7 °C)  Pulse:  [67-95] 90  Resp:  [10-21] 14  SpO2:  [92 %-100 %] 97 %  BP: ()/(35-75) 150/55     Weight: 94.9 kg (209 lb 3.5 oz)  Body mass index is 40.86 kg/m².    Intake/Output Summary (Last 24 hours) at 10/28/17 1848  Last data filed at 10/28/17 1800   Gross per 24 hour   Intake           751.67 ml   Output              200 ml   Net           551.67 ml      Physical Exam   Constitutional: She appears well-developed and well-nourished.   HENT:   Head: Normocephalic.   Nose: Nose normal.   Mouth/Throat: Oropharynx is clear and moist.   Eyes: Conjunctivae and EOM are normal.   Neck: Normal range of motion. JVD present.   Cardiovascular: Normal rate, regular rhythm, normal heart sounds and intact distal pulses.    No murmur heard.  Pulmonary/Chest: No respiratory distress.  She has wheezes. She has rales.   Abdominal: Soft. Bowel sounds are normal. She exhibits no distension. There is no tenderness.   Genitourinary:   Genitourinary Comments: Deferred   Neurological: She is alert.   Oriented to self   Skin: Skin is warm and dry.   Psychiatric: She has a normal mood and affect. Her speech is delayed. She is withdrawn. Cognition and memory are impaired.       Significant Labs:   CBC:   Recent Labs  Lab 10/27/17  2045 10/28/17  0652   WBC 13.12* 12.57   HGB 8.3* 8.3*   HCT 28.9* 28.9*   * 249     CMP:   Recent Labs  Lab 10/27/17  2045 10/28/17  0133 10/28/17  0652   * 135* 132*   K 6.7* 7.2* 6.7*   CL 96 99 97   CO2 18* 10* 16*   GLU 66* 90 116*   BUN 76* 79* 86*   CREATININE 3.6* 3.7* 3.9*   CALCIUM 8.7 8.4* 8.7   PROT 7.4  --  8.3   ALBUMIN 2.7*  --  2.6*   BILITOT 4.4*  --  3.4*   ALKPHOS 138*  --  129   *  --  653*   *  --  154*   ANIONGAP 21* 26* 19*   EGFRNONAA 13* 12* 12*       Significant Imaging:   Imaging Results          X-Ray Chest 1 View (Final result)  Result time 10/28/17 15:11:31    Final result by Joe Acosta Jr., MD (10/28/17 15:11:31)                 Impression:     CHF exacerbation.      Electronically signed by: JOE ACOSTA  Date:     10/28/17  Time:    15:11              Narrative:     Exam: Portable chest radiograph    History:    Shortness of breath    Findings: Left IJ Vas-Cath distal tips in the high right atrium.  Cardiomegaly.  Small pleural effusions.  Vascular congestion with interstitial edema.  No pneumothorax.                             IR Heart Cath Images (In process)                CT Head Without Contrast (Final result)  Result time 10/28/17 09:25:54    Final result by Joe Acosta Jr., MD (10/28/17 09:25:54)                 Impression:          No acute intracranial process.  Limited study due to motion.    All CT scan at this facility use dose modulation, iterative reconstruction, and/or weight base dosing  when appropriate to reduce radiation dose to as low as reasonably achievable.      Electronically signed by: JOE ACOSTA  Date:     10/28/17  Time:    09:25              Narrative:    Reason: Altered mental status    Technique: Head CT without IV contrast.    Comparisons: <None.>    Findings:      Study is limited due to motion.  Benign appearing basal ganglia calcifications.  Atrophy with periventricular white matter changes consistent with small vessel ischemic change.  Old lacunar infarct in the left internal capsule and deep left frontal white matter.  No obvious extra-axial acute fluid collection.  Normal gray-white differentiation without hemorrhage, mass effect, or midline shift. Ventricles and cisterns are within normal limits. No other cerebral or cerebellar abnormalities.  Paranasal sinuses and mastoid air cells are clear.                             US Retroperitoneal Complete (Kidney and (Final result)  Result time 10/28/17 09:12:08    Final result by Joe Acosta Jr., MD (10/28/17 09:12:08)                 Impression:        Normal right kidney.  Left kidney is obscured due to bowel gas.  Collapsed bladder due to Peguero catheter.      Electronically signed by: JOE ACOSTA  Date:     10/28/17  Time:    09:12              Narrative:    Exam: Complete retroperitoneal ultrasound    Clinical Indication: Renal failure    Findings: Multiple sonographic grayscale and Doppler images were obtained of the kidneys and bladder.  The bladder is empty with a Peguero catheter in place.  Right kidney measures 9.1 cm with normal echogenicity and no focal abnormalities.  No renal calculus or hydronephrosis.  Left kidney is obscured due to bowel gas.                            Assessment/Plan:      * Acute renal failure on CKD stage 3 with hyperkalemia    - Patient underwent LHC + RHC at [facility] 10/20/17; ? [facility].  - Patient was on Bumex 2mg daily with no potassium supplementation at NH.    - Creatinine 3.6  (baseline 1.7), K 6.7 on admission.  Labs from [facility] 10/24 with cr. 1.59, K 4.8.  - Calcium gluconate 1gm IV, insulin 10 units IV + D50, and Kayexalate 30gm given in ED.  - EKG without peaked T waves, P wave present with increased MN interval. No arrhythmias on telemetry.   - Will admit to Telemetry.  - Continuous IVF's with 0.9%NS @ 100mL/hr.  - BMP Q4 hours for now.  - Will insert rincon to ensure no retention.  - Check CPK to r/o Rhabdo.  - Renal U/S.  - Nephrology consulted.  If no improvement in K, likely will need CRRT.  - Avoid nephrotoxic agents.  -Creatinine 3.6>>3.9  -Vascular Surgery consulted for Vas Cath placement  -HD initiated per Nephrology        Subacute liver failure without hepatic coma    -GI consulted   -lactulose initiated   -Tylenol level WNL  - PT/INR results pending             Severe mitral valve stenosis    - Severe mitral stenosis with mean inflow gradient of 10 mmHg, severe leaflet and annular calcification with valve area of 0.8 sq cm; tricuspid valve structurally normal, but has moderate to severe tricuspid insufficiency; Severe pulmonary hypertension (PAP 56 mmHg with wedge of 35) per [facility] 10/17 + RHC 10/20 at [facility].  - Patient evaluated by CVT surgery at [facility] on 10/20, and patient was not considered a candidate for surgical intervention.  It was felt patient was terminal with the current state of heart disease and palliative/hospice care was recommended, unfortunately patient declined.  - Hold statin, diuretics, and antihypertensives for now.  Will resume once/if able.  - Consider family conference to re-evaluate palliative/hospice care option. Daughter (Adriana Nieto) is surrogate decision maker.  -Echo results pending        Metabolic acidosis    - Plan as above.  -Nephrology following   -HD initiated         Chronic hepatitis C without hepatic coma    - , , T bili 4.4 on admission.  Previous labs at [facility] on 10/9/17 with AST 71, ALT 23, T bili 0.8.  -AST//154 on  10/28/17  - Hold home statin, and analgesics with acetaminophen.  - Check   - GI consulted         Coronary artery disease involving native coronary artery    - 70% stenosis of LCx to OM and high-grade  of RCA per [facility] on 10/20 at [facility].  - Patient evaluated by CVT surgery, and not deemed a candidate for surgical revascularization.  Medical management and palliative/hospice care recommended.  - Beta blocker and statin on hold due to above.  Resume once safe.          Chronic diastolic heart failure due to valvular disease    - Clinically appears on dry side (weight down 3kg from previously documented dry weight).  - Continuous IVF's as above, cautious not to overload.  - Home diuretics on hold for now.  - Strict I&O's, daily weights.  - chest xray showed vascular congestion with interstitial edema with no pneumothorax on 10/28/17  - HD initiated   - Echo results pending        Encephalopathy, metabolic    - Likely secondary to above.  - CT head showed no acute intracranial process.  Limited study due to motion.  - Check ABG, UDS.  - Neuro checks.  -ammonia level 67        Hyperkalemia    -Insulin, D50, and Calcium given in ED  -Kayexalate continued   -7.2>>6.7  -repeat level in am             Essential hypertension    - BP well controlled without antihypertensives.  - Monitor BP trends, and resume appropriate BP meds as needed.        Type 2 diabetes mellitus with kidney complication, with long-term current use of insulin    - Glucose 66 on admission.  -stable  - Will hold home Lantus and SSI due to #1.   - AccuChecks Q6 hours.  - HbA1c 6.1 in 9/2017.        COPD (chronic obstructive pulmonary disease)    -Duonebs prn   -Arformoterol continued   -oxygen supplementation               VTE Risk Mitigation         Ordered     Medium Risk of VTE  Once      10/28/17 0026     Place sequential compression device  Until discontinued      10/28/17 0026          Critical care time spent on the evaluation and treatment  of severe organ dysfunction, review of pertinent labs and imaging studies, discussions with consulting providers and discussions with patient/family: 50minutes.    Casandra Bergman NP  Department of Hospital Medicine   [facility]

## 2017-10-29 NOTE — ASSESSMENT & PLAN NOTE
- Severe mitral stenosis with mean inflow gradient of 10 mmHg, severe leaflet and annular calcification with valve area of 0.8 sq cm; tricuspid valve structurally normal, but has moderate to severe tricuspid insufficiency; Severe pulmonary hypertension (PAP 56 mmHg with wedge of 35) per [facility] 10/17 + RHC 10/20 at [facility].  - Patient evaluated by CVT surgery at [facility] on 10/20, and patient was not considered a candidate for surgical intervention.  It was felt patient was terminal with the current state of heart disease and palliative/hospice care was recommended, unfortunately patient declined.  - Hold statin, diuretics, and antihypertensives for now.  Will resume once/if able.  - Consider family conference to re-evaluate palliative/hospice care option. Daughter (Adriana Nieto) is surrogate decision maker.  -Echo results pending

## 2017-10-29 NOTE — EICU
eICU Note :    Called by the [facility] eRN:    Problem: High INR 10/PT>100 and PTT 60    Pertinent History and labs reviewed :62-year-old female with history of multivessel coronary artery disease, mitral valve stenosis, severe tricuspid insufficiency, pulmonary hypertension, CK D stage III, hepatitis C, polysubstance abuse.patient has chronic hepatitis C with hepatic coma.  Elevated AST and ALT,Elevated ProTime greater than 100 and INR greater than 10, with a PTT greater than 60      Treatment /Intervention given:Will continue to observe, pt is not bleeding actively.        Birgit Hoffmann M.D  eICU Physician  4:41 AM  INR repeat >10 h/h is 6.6/22.3, decreased from 8.3 , will continue to observe.  6:27 AM  BRBPR , and slight hematuria noted.MS 1mg x1 given

## 2017-10-29 NOTE — PROGRESS NOTES
Call back received from [facility] representative.  He will arrive at hospital between 2:00 p.m.-2:30 p.m.  Packet copied.  Demetra Valderrama LMSW, MADI-BARRETT, [facility]  10/29/2017

## 2017-10-29 NOTE — PROGRESS NOTES
Mr. Stone from [facility] arrived at [facility] to meet with family.  Patient's son, Cornelia at the bedside.  SW asked son if his sister Adriana was on the way.  Patient's son stated to SW that Adriana is not the decision maker and that we should remove her name from his mother's paperwork.  Patient's son stated to SW that his sister Shaina was the decision maker and she was on her way.  Hospice representative present during this conversation.  Demetra Valderrama LMSW,MADI-BARRETT, [facility]  10/29/2017

## 2017-10-29 NOTE — SUBJECTIVE & OBJECTIVE
Interval History: tolerated HD and     Objective:     Vital Signs (Most Recent):  Temp: 98.1 °F (36.7 °C) (10/29/17 1100)  Pulse: 87 (10/29/17 1100)  Resp: (!) 24 (10/29/17 1100)  BP: (!) 127/40 (10/29/17 1100)  SpO2: 97 % (10/29/17 1100) Vital Signs (24h Range):  Temp:  [97.6 °F (36.4 °C)-98.5 °F (36.9 °C)] 98.1 °F (36.7 °C)  Pulse:  [70-96] 87  Resp:  [10-30] 24  SpO2:  [92 %-99 %] 97 %  BP: ()/(14-85) 127/40     Weight: 92.6 kg (204 lb 2.3 oz)  Body mass index is 39.87 kg/m².      Intake/Output Summary (Last 24 hours) at 10/29/17 1237  Last data filed at 10/29/17 1100   Gross per 24 hour   Intake              550 ml   Output             1045 ml   Net             -495 ml       Physical Exam   Constitutional: She appears well-nourished. She appears lethargic. She has a sickly appearance. She is not intubated. Nasal cannula in place.   Restless, more difficult to arouse and only simple nonsensical answers today   HENT:   Head: Normocephalic.   Eyes: Conjunctivae are normal. Pupils are equal, round, and reactive to light.   Neck: No JVD present. No tracheal deviation present.   Cardiovascular: Normal rate and regular rhythm.   No extrasystoles are present.   Murmur heard.   Systolic murmur is present   Pulses:       Radial pulses are 2+ on the right side, and 2+ on the left side.        Dorsalis pedis pulses are 1+ on the right side, and 1+ on the left side.   Pulmonary/Chest: Effort normal. No accessory muscle usage. She is not intubated. She has decreased breath sounds. She has no wheezes. She has no rhonchi. She has no rales.   Abdominal: Soft. She exhibits no distension. Bowel sounds are decreased. There is no tenderness.   Musculoskeletal: She exhibits no edema.   Neurological: She appears lethargic. GCS eye subscore is 2. GCS verbal subscore is 4. GCS motor subscore is 4.   Skin: Skin is dry. Capillary refill takes more than 3 seconds.       Vents:  Oxygen Concentration (%): 28 (10/28/17  0505)    Lines/Drains/Airways     Central Venous Catheter Line                 Hemodialysis Catheter 10/28/17 1245 left internal jugular less than 1 day          Drain                 Urethral Catheter 10/28/17 0425 Non-latex 16 Fr. 1 day         Hemodialysis AV Fistula 10/28/17 1245 Other less than 1 day          Pressure Ulcer                 Pressure Ulcer 10/28/17 1305 Left other (see comments) Stage II less than 1 day          Peripheral Intravenous Line                 Peripheral IV - Single Lumen 10/27/17 2045 Right Other 1 day                Significant Labs:    CBC/Anemia Profile:    Recent Labs  Lab 10/27/17  2045 10/28/17  0652 10/29/17  0342   WBC 13.12* 12.57 14.58*   HGB 8.3* 8.3* 6.6*   HCT 28.9* 28.9* 22.3*   * 249 273   MCV 79* 77* 75*   RDW 21.1* 20.8* 20.9*        Chemistries:    Recent Labs  Lab 10/27/17  2045  10/28/17  0652 10/28/17  2050 10/29/17  0342   *  < > 132* 137 138   K 6.7*  < > 6.7* 4.1 4.4   CL 96  < > 97 96 97   CO2 18*  < > 16* 25 20*   BUN 76*  < > 86* 46* 52*   CREATININE 3.6*  < > 3.9* 2.5* 2.8*   CALCIUM 8.7  < > 8.7 8.4* 8.2*   ALBUMIN 2.7*  --  2.6*  --   --    PROT 7.4  --  8.3  --   --    BILITOT 4.4*  --  3.4*  --   --    ALKPHOS 138*  --  129  --   --    *  --  154*  --   --    *  --  653*  --   --    MG  --   --  3.5*  --   --    < > = values in this interval not displayed.    All pertinent labs within the past 24 hours have been reviewed.

## 2017-10-29 NOTE — PROGRESS NOTES
[facility] Hospital Medicine  Progress Note    Patient Name: Becki Nieto  MRN: [id number]  Patient Class: IP- Inpatient   Admission Date: 10/27/2017  Length of Stay: 2 days  Attending Physician: Noel Reyes MD  Primary Care Provider: Noel Winchester MD        Subjective:     Principal Problem:Acute renal failure superimposed on stage 3 chronic kidney disease    HPI:  Unable to obtain history from patient due to AMS.  History obtained from ED staff, NH records, and medical records reviewed in Epic.  Ms. Nieto is a 63yo AA female with an extensive PMHx of multivessel CAD, severe mitral valve stenosis, severe tricuspid insufficiency, severe secondary pulmonary hypertension, HTN, chronic diastolic HFpEF of 60%, HLD, COPD with chronic respiratory failure on home O2, Hep C, noncompliance, polysubstance abuse with h/o IV drug use, CKD stage III, and DM II, who was transferred from NH to ED due to elevated potassium (7.1) found on routine lab work today.  Patient was on Bumex 2mg daily with no potassium supplementation at NH.  Initial work-up in ED resulted K 6.7, BUN 76, cr. 3.6, T bili 4.4, , , H&H 8.3/29.  She was just DC from [facility] yesterday with K 4.8, cr, 1.59.  Patient received calcium gluconate IV, insulin IV with D50, and Kayexalate in ED.  No arrhythmias on telemetry monitor, EKG stable.  Hospital Medicine was consulted for admission.  Currently, patient appears comfortable, in NAD.  She opens eye to verbal stimuli, but only mumbles incomprehensible words.  It is important to note that patient underwent RHC + LHC at [facility] on 10/20/17.  Patient has numerous prior admissions at [facility] for decompensated HF, CAD, and valvular heart disease (13 admissions in the past 1 year).  Last hospitalized at [facility] 10/9/2017-10/26/2017 for noncompliance/drug overdose.  During that admission, she was evaluated by Cardiology and CVT surgery, and deemed to be poor surgical candidate.  Hospice was recommended,  but patient declined.  Subsequently, she was DC to NH facility.      Hospital Course:  Pt admitted to Telemetry Unit for acute on chronic renal failure with hyperkalemia with Nephrology consulted.  Liver enzymes significantly elevated from baseline with elevated ammonia level noted and GI consulted.  Lactulose initiated.  Pt transferred to ICU for critical care management.  Vascular Surgery consulted for Vas Cath placement and initiation of HD.  Hyperkalemia improved with Kayexalate.      10/29:  Patient had vas cath placed yesterday and received course of dialysis for clearance and minimal fluid removal;  Renal function continues to deteriorate and now with anuria;  After discussion with nephrology consultant; no further dialysis, and family has made her a DNR; family desirous of proceeding with in patient Hospice;  Case management has been consulted.  Likely cardio-renal/ hepato- renal deterioration.  Prognosis grave.    Interval History: poorly arousable; continues anuric    Review of Systems   Constitutional: Positive for fatigue.   HENT: Negative.    Eyes: Negative.    Respiratory: Negative.    Cardiovascular: Negative.    Gastrointestinal: Positive for abdominal distention.   Genitourinary:        Peguero in place; no urine output   Musculoskeletal: Negative.    Skin: Negative.    Neurological: Positive for speech difficulty and weakness.   Hematological: Negative.    Psychiatric/Behavioral:        Cannot assess     Objective:     Vital Signs (Most Recent):  Temp: 98 °F (36.7 °C) (10/29/17 0700)  Pulse: 90 (10/29/17 0749)  Resp: (!) 30 (10/29/17 0749)  BP: (!) 137/41 (10/29/17 0700)  SpO2: 96 % (10/29/17 0743) Vital Signs (24h Range):  Temp:  [97.6 °F (36.4 °C)-98.5 °F (36.9 °C)] 98 °F (36.7 °C)  Pulse:  [70-96] 90  Resp:  [10-30] 30  SpO2:  [92 %-99 %] 96 %  BP: ()/(14-85) 137/41     Weight: 92.6 kg (204 lb 2.3 oz)  Body mass index is 39.87 kg/m².    Intake/Output Summary (Last 24 hours) at 10/29/17  0831  Last data filed at 10/29/17 0500   Gross per 24 hour   Intake              500 ml   Output             1045 ml   Net             -545 ml      Physical Exam   Constitutional: She appears well-developed and well-nourished.   Chronically ill appearing.   Pulmonary/Chest:   Shallow repirations   Genitourinary:   Genitourinary Comments: Deferred; rincon in place   Musculoskeletal: Normal range of motion.   Neurological:   Poor to arouse; non communicative   Skin: Skin is warm and dry.   Pitting edema to hands and pretibially   Psychiatric:   obtunded   Nursing note and vitals reviewed.      Significant Labs:   BMP:   Recent Labs  Lab 10/28/17  0652  10/29/17  0342   *  < > 129*   *  < > 138   K 6.7*  < > 4.4   CL 97  < > 97   CO2 16*  < > 20*   BUN 86*  < > 52*   CREATININE 3.9*  < > 2.8*   CALCIUM 8.7  < > 8.2*   MG 3.5*  --   --    < > = values in this interval not displayed.  CBC:   Recent Labs  Lab 10/27/17  2045 10/28/17  0652 10/29/17  0342   WBC 13.12* 12.57 14.58*   HGB 8.3* 8.3* 6.6*   HCT 28.9* 28.9* 22.3*   * 249 273     Troponin: No results for input(s): TROPONINI in the last 48 hours.    Significant Imaging: I have reviewed all pertinent imaging results/findings within the past 24 hours.    Assessment/Plan:      * Acute renal failure on CKD stage 3 with hyperkalemia    - Patient underwent LHC + RHC at [facility] 10/20/17; ? KYLER.  - Patient was on Bumex 2mg daily with no potassium supplementation at NH.    - Creatinine 3.6 (baseline 1.7), K 6.7 on admission.  Labs from [facility] 10/24 with cr. 1.59, K 4.8.  - Calcium gluconate 1gm IV, insulin 10 units IV + D50, and Kayexalate 30gm given in ED.  - EKG without peaked T waves, P wave present with increased NM interval. No arrhythmias on telemetry.   - Will admit to Telemetry.  - Continuous IVF's with 0.9%NS @ 100mL/hr.  - BMP Q4 hours for now.  - Will insert rincon to ensure no retention.  - Check CPK to r/o Rhabdo.  - Renal U/S.  - Nephrology  consulted.  If no improvement in K, likely will need CRRT.  - Avoid nephrotoxic agents.  -Creatinine 3.6>>3.9  -Vascular Surgery consulted for Vas Cath placement  -HD initiated per Nephrology        Subacute liver failure without hepatic coma    -GI consulted   -lactulose initiated   -Tylenol level WNL  - PT/INR results pending     10/29:  Question hepato renal/ cardiorenal process; Continues slide in hepatic function and coagulopathic        Severe mitral valve stenosis    - Severe mitral stenosis with mean inflow gradient of 10 mmHg, severe leaflet and annular calcification with valve area of 0.8 sq cm; tricuspid valve structurally normal, but has moderate to severe tricuspid insufficiency; Severe pulmonary hypertension (PAP 56 mmHg with wedge of 35) per [facility] 10/17 + RHC 10/20 at [facility].  - Patient evaluated by CVT surgery at [facility] on 10/20, and patient was not considered a candidate for surgical intervention.  It was felt patient was terminal with the current state of heart disease and palliative/hospice care was recommended, unfortunately patient declined.  - Hold statin, diuretics, and antihypertensives for now.  Will resume once/if able.  - Consider family conference to re-evaluate palliative/hospice care option. Daughter (Adriana Nieto) is surrogate decision maker.  -Echo results pending        Metabolic acidosis    - Plan as above.  -Nephrology following   -HD initiated         Chronic hepatitis C without hepatic coma    - , , T bili 4.4 on admission.  Previous labs at [facility] on 10/9/17 with AST 71, ALT 23, T bili 0.8.  -AST//154 on 10/28/17  - Hold home statin, and analgesics with acetaminophen.  - Check   - GI consulted         Coronary artery disease involving native coronary artery    - 70% stenosis of LCx to OM and high-grade  of RCA per C on 10/20 at [facility].  - Patient evaluated by CVT surgery, and not deemed a candidate for surgical revascularization.  Medical management and  palliative/hospice care recommended.  - Beta blocker and statin on hold due to above.  Resume once safe.          Chronic diastolic heart failure due to valvular disease    - Clinically appears on dry side (weight down 3kg from previously documented dry weight).  - Continuous IVF's as above, cautious not to overload.  - Home diuretics on hold for now.  - Strict I&O's, daily weights.  - chest xray showed vascular congestion with interstitial edema with no pneumothorax on 10/28/17  - HD initiated   - Echo results pending        Encephalopathy, metabolic    - Likely secondary to above.  - CT head showed no acute intracranial process.  Limited study due to motion.  - Check ABG, UDS.  - Neuro checks.  -ammonia level 67        Hyperkalemia    -Insulin, D50, and Calcium given in ED  -Kayexalate continued   -7.2>>6.7  -repeat level in am             Essential hypertension    - BP well controlled without antihypertensives.  - Monitor BP trends, and resume appropriate BP meds as needed.    10/29:  Maintaining BP at present.  Continue present regimen and monitoring.        Type 2 diabetes mellitus with kidney complication, with long-term current use of insulin    - Glucose 66 on admission.  -stable  - Will hold home Lantus and SSI due to #1.   - AccuChecks Q6 hours.  - HbA1c 6.1 in 9/2017.    10/29:  Continue present regimen with monitoring  Hospice consult placed.        COPD (chronic obstructive pulmonary disease)    -Duonebs prn   -Arformoterol continued   -oxygen supplementation               VTE Risk Mitigation         Ordered     Medium Risk of VTE  Once      10/28/17 0026     Place sequential compression device  Until discontinued      10/28/17 0026          Critical care time spent on the evaluation and treatment of severe organ dysfunction, review of pertinent labs and imaging studies, discussions with consulting providers and discussions with patient/family: 45 minutes.    Noel Dang MD  Department of Hospital  Medicine   [facility]

## 2017-10-29 NOTE — SUBJECTIVE & OBJECTIVE
Interval History: Course has been complicated with overnight severe GI bleed with coagulopathy and hypercoagulable state.  Patient is now DO NOT RESUSCITATE.  No more dialysis.  Received 1 session of dialysis for life-threatening hyperkalemia and metabolic acidosis.    Family meeting held today    Review of patient's allergies indicates:   Allergen Reactions    Corticosteroids (glucocorticoids) Shortness Of Breath    Corticosteroids (glucocorticoids) Shortness Of Breath     Per prior information from merged chart.     Current Facility-Administered Medications   Medication Frequency    0.9%  NaCl infusion Once    albuterol-ipratropium 2.5mg-0.5mg/3mL nebulizer solution 3 mL Q6H    arformoterol nebulizer solution 15 mcg BID    budesonide nebulizer solution 0.5 mg Q12H    lorazepam (ATIVAN) injection 2 mg Q3H PRN    morphine injection 2 mg Q3H PRN    ondansetron injection 4 mg Q12H PRN    phytonadione vitamin k (AQUA-MEPHYTON) 10 mg in dextrose 5 % 50 mL IVPB Once    promethazine (PHENERGAN) 6.25 mg in dextrose 5 % 50 mL IVPB Q6H PRN       Objective:     Vital Signs (Most Recent):  Temp: 98 °F (36.7 °C) (10/29/17 0700)  Pulse: 84 (10/29/17 0800)  Resp: 19 (10/29/17 0800)  BP: (!) 129/37 (10/29/17 0800)  SpO2: (!) 94 % (10/29/17 0800)  O2 Device (Oxygen Therapy): nasal cannula (10/29/17 0743) Vital Signs (24h Range):  Temp:  [97.6 °F (36.4 °C)-98.5 °F (36.9 °C)] 98 °F (36.7 °C)  Pulse:  [70-96] 84  Resp:  [10-30] 19  SpO2:  [92 %-99 %] 94 %  BP: ()/(14-85) 129/37     Weight: 92.6 kg (204 lb 2.3 oz) (10/29/17 0505)  Body mass index is 39.87 kg/m².  Body surface area is 1.98 meters squared.    I/O last 3 completed shifts:  In: 1251.7 [I.V.:551.7; Other:500; IV Piggyback:200]  Out: 1220 [Urine:220; Other:1000]    Physical Exam   Constitutional: She appears well-nourished. She appears lethargic. She has a sickly appearance. She is not intubated. Nasal cannula in place.   HENT:   Head: Normocephalic.   Eyes:  Conjunctivae are normal. Pupils are equal, round, and reactive to light.   Neck: No JVD present. No tracheal deviation present.   Cardiovascular: Normal rate and regular rhythm.   No extrasystoles are present.   Murmur heard.   Systolic murmur is present   Pulses:       Radial pulses are 2+ on the right side, and 2+ on the left side.        Dorsalis pedis pulses are 1+ on the right side, and 1+ on the left side.   Positive JVD   Pulmonary/Chest: Effort normal. No accessory muscle usage. She is not intubated. She has decreased breath sounds. She has no wheezes. She has no rhonchi. She has no rales.   Abdominal: Soft. She exhibits no distension. Bowel sounds are decreased. There is no tenderness.   Musculoskeletal: She exhibits no edema.   Neurological: She appears lethargic. GCS eye subscore is 2. GCS verbal subscore is 4. GCS motor subscore is 5.   Skin: Skin is dry. Capillary refill takes more than 3 seconds.       Significant Labs:  All labs within the past 24 hours have been reviewed.     Significant Imaging:  Labs: Reviewed

## 2017-10-29 NOTE — ASSESSMENT & PLAN NOTE
- Clinically appears on dry side (weight down 3kg from previously documented dry weight).  - Continuous IVF's as above, cautious not to overload.  - Home diuretics on hold for now.  - Strict I&O's, daily weights.  - chest xray showed vascular congestion with interstitial edema with no pneumothorax on 10/28/17  - HD initiated   - Echo results pending

## 2017-10-29 NOTE — PROGRESS NOTES
BARRETT spoke with Edilma at [facility] to advise that patient needs hospice and may discharge today if we can get it arranged. BARRETT asked of provider that works with NH and if there were any restrictions as to providers.  Ms. France advised SW that the usually contract with [facility] but there were no restrictions on providers.  BARRETT advised Ms. France that SW would f/u with her after speaking with patient's daughter.  Demetra Valderrama LMSW, MADI-[place], [facility]  10/29/2017

## 2017-10-29 NOTE — NURSING
Patient noted to having abrupt bradycardia. Upon entering room family noted to be at bedisde. Patient assessed and noted to have agonal type breathing pattern and be completely unresponsive to stimuli. Heart rate continued to slow. At approx 15:43 patient had asystole on heart monitor. Dr Reyes notified initially with bradycardic episode.  Dr Reyes came to bedside and pronounced patient .

## 2017-10-29 NOTE — ASSESSMENT & PLAN NOTE
- 70% stenosis of LCx to OM and high-grade  of RCA per [facility] on 10/20 at [facility].  - Patient evaluated by CVT surgery, and not deemed a candidate for surgical revascularization.  Medical management and palliative/hospice care recommended.  - Beta blocker and statin on hold due to above.  Resume once safe.

## 2017-10-29 NOTE — ASSESSMENT & PLAN NOTE
- Glucose 66 on admission.  -stable  - Will hold home Lantus and SSI due to #1.   - AccuChecks Q6 hours.  - HbA1c 6.1 in 9/2017.    10/29:  Continue present regimen with monitoring  Hospice consult placed.

## 2017-10-29 NOTE — PROGRESS NOTES
[facility] -   Critical Care Medicine  Progress Note    Patient Name: Becki Nieto  MRN: [id number]  Admission Date: 10/27/2017  Hospital Length of Stay: 2 days  Code Status: DNR  Attending Provider: Noel Reyes MD  Primary Care Provider: Noel Winchester MD   Principal Problem: Acute renal failure superimposed on stage 3 chronic kidney disease    Subjective:     HPI:  62 year old female with PMH of CKD, Hep C, DM2, HTN, HLD, COPD on home oxygen, and chronic diastolic heart failure with preserved EF, secondary pulmonary HTN, severe MV stenosis, and severe tricuspid insufficiency along with IV drug abuse; she was admitted at [facility] from 10/9/17-10/26/17 for IV drug overdose during that hospitalization she underwent evaluation by cardiology and CVT surgery re:heart failure, valvular disease, she was denied surgical intervention s/t ongoing IV drug abuse and hospice care was recommended, she declined hospice care and was discharged to NH care  10/27 she presented to ED from NH s/t elevated potassium on NH labs  In ED pt is obtunded with incomprehensible speech and evaluation revealed K 6.7, creatinine 3.6, elevated liver enzymes    Hospital/ICU Course:  10/27 - admitted to telemetry after given calcium gluconate, iv insulin and D50, and kayexalate given in ED  10/28 - transferred to ICU after placement of vas cath for HD for filtration of potassium, lactate    Interval History: tolerated HD and     Objective:     Vital Signs (Most Recent):  Temp: 98.1 °F (36.7 °C) (10/29/17 1100)  Pulse: 87 (10/29/17 1100)  Resp: (!) 24 (10/29/17 1100)  BP: (!) 127/40 (10/29/17 1100)  SpO2: 97 % (10/29/17 1100) Vital Signs (24h Range):  Temp:  [97.6 °F (36.4 °C)-98.5 °F (36.9 °C)] 98.1 °F (36.7 °C)  Pulse:  [70-96] 87  Resp:  [10-30] 24  SpO2:  [92 %-99 %] 97 %  BP: ()/(14-85) 127/40     Weight: 92.6 kg (204 lb 2.3 oz)  Body mass index is 39.87 kg/m².      Intake/Output Summary (Last 24 hours) at 10/29/17 1237  Last data  filed at 10/29/17 1100   Gross per 24 hour   Intake              550 ml   Output             1045 ml   Net             -495 ml       Physical Exam   Constitutional: She appears well-nourished. She appears lethargic. She has a sickly appearance. She is not intubated. Nasal cannula in place.   Restless, more difficult to arouse and only simple nonsensical answers today   HENT:   Head: Normocephalic.   Eyes: Conjunctivae are normal. Pupils are equal, round, and reactive to light.   Neck: No JVD present. No tracheal deviation present.   Cardiovascular: Normal rate and regular rhythm.   No extrasystoles are present.   Murmur heard.   Systolic murmur is present   Pulses:       Radial pulses are 2+ on the right side, and 2+ on the left side.        Dorsalis pedis pulses are 1+ on the right side, and 1+ on the left side.   Pulmonary/Chest: Effort normal. No accessory muscle usage. She is not intubated. She has decreased breath sounds. She has no wheezes. She has no rhonchi. She has no rales.   Abdominal: Soft. She exhibits no distension. Bowel sounds are decreased. There is no tenderness.   Musculoskeletal: She exhibits no edema.   Neurological: She appears lethargic. GCS eye subscore is 2. GCS verbal subscore is 4. GCS motor subscore is 4.   Skin: Skin is dry. Capillary refill takes more than 3 seconds.       Vents:  Oxygen Concentration (%): 28 (10/28/17 0505)    Lines/Drains/Airways     Central Venous Catheter Line                 Hemodialysis Catheter 10/28/17 1245 left internal jugular less than 1 day          Drain                 Urethral Catheter 10/28/17 0425 Non-latex 16 Fr. 1 day         Hemodialysis AV Fistula 10/28/17 1245 Other less than 1 day          Pressure Ulcer                 Pressure Ulcer 10/28/17 1305 Left other (see comments) Stage II less than 1 day          Peripheral Intravenous Line                 Peripheral IV - Single Lumen 10/27/17 2045 Right Other 1 day                Significant  Labs:    CBC/Anemia Profile:    Recent Labs  Lab 10/27/17  2045 10/28/17  0652 10/29/17  0342   WBC 13.12* 12.57 14.58*   HGB 8.3* 8.3* 6.6*   HCT 28.9* 28.9* 22.3*   * 249 273   MCV 79* 77* 75*   RDW 21.1* 20.8* 20.9*        Chemistries:    Recent Labs  Lab 10/27/17  2045  10/28/17  0652 10/28/17  2050 10/29/17  0342   *  < > 132* 137 138   K 6.7*  < > 6.7* 4.1 4.4   CL 96  < > 97 96 97   CO2 18*  < > 16* 25 20*   BUN 76*  < > 86* 46* 52*   CREATININE 3.6*  < > 3.9* 2.5* 2.8*   CALCIUM 8.7  < > 8.7 8.4* 8.2*   ALBUMIN 2.7*  --  2.6*  --   --    PROT 7.4  --  8.3  --   --    BILITOT 4.4*  --  3.4*  --   --    ALKPHOS 138*  --  129  --   --    *  --  154*  --   --    *  --  653*  --   --    MG  --   --  3.5*  --   --    < > = values in this interval not displayed.    All pertinent labs within the past 24 hours have been reviewed.      Assessment/Plan:       Problem   Chronic Diastolic Heart Failure Due to Valvular Disease   Acute renal failure on CKD stage 3 with hyperkalemia   Coagulopathy   Encephalopathy, Metabolic   Metabolic Acidosis   Subacute Liver Failure Without Hepatic Coma   Severe Mitral Valve Stenosis   Type 2 Diabetes Mellitus With Kidney Complication, With Long-Term Current Use of Insulin   Essential Hypertension     1. Neuro: poor response to acidosis correction and therapeutic lactulose, now holding - unable to take po and enema not indicated with transition to comfort measures; prn anxiolytic  2. Pulmonary: supplemental oxygen and nebs for decreased SOB/air hunger  3. Cardiac: end stage heart failure, family understands prognosis; agree with transition to comfort care, inpatient hospice   4. Renal: no acute indication for HD  5. Infectious Disease: sepsis surveillance  6. Hematology/Oncology: vit K IVPB, consider transfuse FFP if spontaneous bleeding occurs while inpt  7. Endocrine: glucose trend adequate will stop finger sticks with transition to comfort care  8.  Fluids/Electrolytes/Nutrition/GI: electrolytes normalized with HD  9. Musculoskeletal: ROM  10. Pain Management:  Analgesia available  11. Discharge and Palliative Care: case management consulted to assist with transition to inpatient hospice for comfort / end of life care assistance    Preventive Measures:   Nutrition: NPO, would allow pleasure feed if wakes and requests  Stress Ulcer: IV pepcid  DVT: contraindicated  Vas Cath Day: 2  Peguero Day: 2  Code Status: DNR    Counseling/Consultation: I have discussed the care of this patient in detail with nursing staff and Dr. Ibarra. Status and plan of care discussed with team on multidisciplinary rounds.     Critical Care Time 38 minutes  Critical secondary to end stage heart failure with subsequent multi organ dysfunction and grave prognosis  Critical care was time spent personally by me on the following activities: development of treatment plan with patient or surrogate and bedside caregivers, discussions with consultants, evaluation of patient's response to treatment, examination of patient, ordering and performing treatments and interventions, ordering and review of laboratory studies, ordering and review of radiographic studies, pulse oximetry, re-evaluation of patient's condition.  This critical care time did not overlap with that of any other provider.    Meena Cullen ACNP-BC  [facility]

## 2017-10-29 NOTE — PROGRESS NOTES
Spoke w/ pt's daughter Shaina Nieto, who after speaking with her sister Shelley and other family members, they have decided to make their mother a DNR at this time. Notified and spoke w/ Dr. Friedman informing him of their decision. MD to talk with family and change pt to a DNR.

## 2017-10-29 NOTE — ASSESSMENT & PLAN NOTE
Acute kidney injury is a part of multisystem organ failure due to critical mitral stenosis and congestive heart failure.  Lengthy discussion with the family today.  Patient is now comfort measures only.  No more life support.  Hospice consideration discussed in detail with the family.  No more dialysis

## 2017-10-29 NOTE — ASSESSMENT & PLAN NOTE
-Insulin, D50, and Calcium given in ED  -Kayexalate continued   -7.2>>6.7  -repeat level in am

## 2017-10-29 NOTE — PROGRESS NOTES
[facility] f/u with [facility] representative to get call reporting information. Per LILLIE Stone, nurse to call report to Charge Nurse at [phone].  Patient discharging to [facility], [address] [address] [place] Bernita Augustine, ILIR, MADI-[place], [facility]  10/29/2017

## 2017-10-29 NOTE — CONSULTS
Call back received from [facility] representative.  He will arrive at hospital between 2:00 p.m.-2:30 p.m.  Packet copied.  Demetra Valderrama LMSW, MADI-[place], [facility]  10/29/2017

## 2017-10-29 NOTE — PLAN OF CARE
10/29/17 1728   Final Note   Assessment Type Final Discharge Note   Discharge Disposition Exp Medical   Right Care Referral Info   Post Acute Recommendation Other

## 2017-10-29 NOTE — ASSESSMENT & PLAN NOTE
- Likely secondary to above.  - CT head showed no acute intracranial process.  Limited study due to motion.  - Check ABG, UDS.  - Neuro checks.  -ammonia level 67

## 2017-10-29 NOTE — PROGRESS NOTES
Spoke w/ Dr. Kirk, informed that pt's BM's have changed from dark brown to dark red w/ bright red streaks and that the small amount of urine she makes has turned to dark red/marroon. Also informed her that pt's RR has increased and BP as well and that pt has non verbal s/s of pain but has no PRN pain meds ordered at this time. MD to order PRN pain med.

## 2017-10-29 NOTE — PLAN OF CARE
Problem: Patient Care Overview  Goal: Plan of Care Review  Outcome: Ongoing (interventions implemented as appropriate)  Pt remains awake and alert but disoriented to place, time, and situation, following some simple commands, but not speaking except repeating yes or ok over and over. Pt's INR >10, PT>100, and a decrease in H/H to 6.6/22.3. Pt has had 3 BM's in which the 3rd one was small but had a color change to dark red w/ bright red streaks. Pt remains oliguric and the color of urine has turned dark red/maroon. Pt unable to swallow or take her PO meds, moderate generalized pitting edema, afebrile, and VSS. Pt's RR more tachycardic this AM, w/ increased moaning and grimacing and PRN Morphine was ordered. Pt's daughter and family updated and POC discussed w/ pt, and family. Pt's daughter Shaina to possibly breach the topic of hospice/comfort care this AM w/ MD's.

## 2017-10-29 NOTE — PROGRESS NOTES
Discharge order, demographics, after visit summary, and discharge summary faxed to [facility] at [phone].  Demetra Valderrama LMSW, ACTEE-[place], CCM  10/29/2017

## 2017-10-29 NOTE — PROGRESS NOTES
[facility] -   Nephrology  Progress Note    Patient Name: Becki Nieto  MRN: [id number]  Admission Date: 10/27/2017  Hospital Length of Stay: 2 days  Attending Provider: Noel Reyes MD   Primary Care Physician: Noel Winchester MD  Principal Problem:Acute renal failure superimposed on stage 3 chronic kidney disease    Subjective:     HPI: Patient is a 62-year-old female with advanced mitral stenosis complicated by pulmonary hypertension and severe congestive heart failure.  Patient is inoperable as recommended by cardiothoracic surgery.  Patient was in the hospital at [facility] and was offered hospice care with the patient.  According to the records patient refused hospice care.  Patient went to the nursing home one day prior to current admission and comes back to the hospital with severe hyperkalemia with potassium greater than 7 persistent despite medical treatment.  Patient's course also has been complicated by severe liver failure and kidney failure.  Nephrology consultation is requested for the treatment of urgent hyperkalemia, metabolic acidosis, acute kidney injury and multisystem failure    Interval History: Course has been complicated with overnight severe GI bleed with coagulopathy and hypercoagulable state.  Patient is now DO NOT RESUSCITATE.  No more dialysis.  Received 1 session of dialysis for life-threatening hyperkalemia and metabolic acidosis.    Family meeting held today    Review of patient's allergies indicates:   Allergen Reactions    Corticosteroids (glucocorticoids) Shortness Of Breath    Corticosteroids (glucocorticoids) Shortness Of Breath     Per prior information from merged chart.     Current Facility-Administered Medications   Medication Frequency    0.9%  NaCl infusion Once    albuterol-ipratropium 2.5mg-0.5mg/3mL nebulizer solution 3 mL Q6H    arformoterol nebulizer solution 15 mcg BID    budesonide nebulizer solution 0.5 mg Q12H    lorazepam (ATIVAN) injection 2 mg  Q3H PRN    morphine injection 2 mg Q3H PRN    ondansetron injection 4 mg Q12H PRN    phytonadione vitamin k (AQUA-MEPHYTON) 10 mg in dextrose 5 % 50 mL IVPB Once    promethazine (PHENERGAN) 6.25 mg in dextrose 5 % 50 mL IVPB Q6H PRN       Objective:     Vital Signs (Most Recent):  Temp: 98 °F (36.7 °C) (10/29/17 0700)  Pulse: 84 (10/29/17 0800)  Resp: 19 (10/29/17 0800)  BP: (!) 129/37 (10/29/17 0800)  SpO2: (!) 94 % (10/29/17 0800)  O2 Device (Oxygen Therapy): nasal cannula (10/29/17 0743) Vital Signs (24h Range):  Temp:  [97.6 °F (36.4 °C)-98.5 °F (36.9 °C)] 98 °F (36.7 °C)  Pulse:  [70-96] 84  Resp:  [10-30] 19  SpO2:  [92 %-99 %] 94 %  BP: ()/(14-85) 129/37     Weight: 92.6 kg (204 lb 2.3 oz) (10/29/17 0505)  Body mass index is 39.87 kg/m².  Body surface area is 1.98 meters squared.    I/O last 3 completed shifts:  In: 1251.7 [I.V.:551.7; Other:500; IV Piggyback:200]  Out: 1220 [Urine:220; Other:1000]    Physical Exam   Constitutional: She appears well-nourished. She appears lethargic. She has a sickly appearance. She is not intubated. Nasal cannula in place.   HENT:   Head: Normocephalic.   Eyes: Conjunctivae are normal. Pupils are equal, round, and reactive to light.   Neck: No JVD present. No tracheal deviation present.   Cardiovascular: Normal rate and regular rhythm.   No extrasystoles are present.   Murmur heard.   Systolic murmur is present   Pulses:       Radial pulses are 2+ on the right side, and 2+ on the left side.        Dorsalis pedis pulses are 1+ on the right side, and 1+ on the left side.   Positive JVD   Pulmonary/Chest: Effort normal. No accessory muscle usage. She is not intubated. She has decreased breath sounds. She has no wheezes. She has no rhonchi. She has no rales.   Abdominal: Soft. She exhibits no distension. Bowel sounds are decreased. There is no tenderness.   Musculoskeletal: She exhibits no edema.   Neurological: She appears lethargic. GCS eye subscore is 2. GCS verbal  subscore is 4. GCS motor subscore is 5.   Skin: Skin is dry. Capillary refill takes more than 3 seconds.       Significant Labs:  All labs within the past 24 hours have been reviewed.     Significant Imaging:  Labs: Reviewed    Assessment/Plan:     * Acute renal failure on CKD stage 3 with hyperkalemia    Acute kidney injury is a part of multisystem organ failure due to critical mitral stenosis and congestive heart failure.  Lengthy discussion with the family today.  Patient is now comfort measures only.  No more life support.  Hospice consideration discussed in detail with the family.  No more dialysis            Thank you for your consult.     Tyler Fernandes MD  Nephrology  [facility]

## 2017-10-29 NOTE — PLAN OF CARE
10/29/17 1645   Final Note   Assessment Type Final Discharge Note   Discharge Disposition Exp Medical   Right Care Referral Info   Post Acute Recommendation Other

## 2017-10-29 NOTE — PROGRESS NOTES
Patient's nurse advised teddy that pt .  SW f/u with [facility] to advise. TEDDY spoke with nurse, Norma, at [facility] to advise.  Awaiting return call from oncall representative.  Demetra Valderrama LMSW,MADI-[place], [facility]  10/29/2017

## 2017-10-30 LAB — HBV SURFACE AG SERPL QL IA: NEGATIVE

## 2017-10-31 LAB
AMPHETAMINES SERPL QL: NEGATIVE
BARBITURATES SERPL QL SCN: NEGATIVE
BENZODIAZ SERPL QL: NEGATIVE
CANNABINOIDS SERPL QL: NEGATIVE
COCAINE, BLOOD: NEGATIVE
ETHANOL SERPL-MCNC: NEGATIVE MG/DL
HEP. B SURF AB, QUAL: POSITIVE
HEP. B SURF AB, QUANT.: 17 MIU/ML
METHADONE SERPL QL SCN: NEGATIVE
OPIATES SERPL QL SCN: NEGATIVE
PCP SERPL QL SCN: NEGATIVE
PROPOXYPH SERPL QL: NEGATIVE

## 2017-11-03 LAB — POCT GLUCOSE: 134 MG/DL (ref 70–110)
